# Patient Record
Sex: MALE | Race: BLACK OR AFRICAN AMERICAN | NOT HISPANIC OR LATINO | URBAN - METROPOLITAN AREA
[De-identification: names, ages, dates, MRNs, and addresses within clinical notes are randomized per-mention and may not be internally consistent; named-entity substitution may affect disease eponyms.]

---

## 2020-11-09 ENCOUNTER — HOSPITAL ENCOUNTER (EMERGENCY)
Facility: HOSPITAL | Age: 30
Discharge: HOME | End: 2020-11-09
Attending: EMERGENCY MEDICINE

## 2020-11-09 VITALS
HEIGHT: 71 IN | SYSTOLIC BLOOD PRESSURE: 140 MMHG | OXYGEN SATURATION: 100 % | TEMPERATURE: 99 F | WEIGHT: 160 LBS | DIASTOLIC BLOOD PRESSURE: 79 MMHG | BODY MASS INDEX: 22.4 KG/M2 | HEART RATE: 88 BPM | RESPIRATION RATE: 16 BRPM

## 2020-11-09 DIAGNOSIS — R11.2 NON-INTRACTABLE VOMITING WITH NAUSEA, UNSPECIFIED VOMITING TYPE: Primary | ICD-10-CM

## 2020-11-09 DIAGNOSIS — K52.9 GASTROENTERITIS: ICD-10-CM

## 2020-11-09 LAB
ALBUMIN SERPL BCP-MCNC: 4.4 G/DL (ref 3.5–5.2)
ALP SERPL-CCNC: 86 U/L (ref 55–135)
ALT SERPL W/O P-5'-P-CCNC: 25 U/L (ref 10–44)
ANION GAP SERPL CALC-SCNC: 9 MMOL/L (ref 8–16)
AST SERPL-CCNC: 28 U/L (ref 10–40)
BASOPHILS # BLD AUTO: 0.04 K/UL (ref 0–0.2)
BASOPHILS NFR BLD: 0.6 % (ref 0–1.9)
BILIRUB SERPL-MCNC: 0.6 MG/DL (ref 0.1–1)
BILIRUB UR QL STRIP: NEGATIVE
BUN SERPL-MCNC: 18 MG/DL (ref 6–20)
CALCIUM SERPL-MCNC: 9.6 MG/DL (ref 8.7–10.5)
CHLORIDE SERPL-SCNC: 98 MMOL/L (ref 95–110)
CLARITY UR: CLEAR
CO2 SERPL-SCNC: 31 MMOL/L (ref 23–29)
COLOR UR: YELLOW
CREAT SERPL-MCNC: 1.1 MG/DL (ref 0.5–1.4)
DIFFERENTIAL METHOD: ABNORMAL
EOSINOPHIL # BLD AUTO: 0 K/UL (ref 0–0.5)
EOSINOPHIL NFR BLD: 0.3 % (ref 0–8)
ERYTHROCYTE [DISTWIDTH] IN BLOOD BY AUTOMATED COUNT: 12.5 % (ref 11.5–14.5)
EST. GFR  (AFRICAN AMERICAN): >60 ML/MIN/1.73 M^2
EST. GFR  (NON AFRICAN AMERICAN): >60 ML/MIN/1.73 M^2
GLUCOSE SERPL-MCNC: 94 MG/DL (ref 70–110)
GLUCOSE UR QL STRIP: NEGATIVE
HCT VFR BLD AUTO: 44.1 % (ref 40–54)
HGB BLD-MCNC: 15.3 G/DL (ref 14–18)
HGB UR QL STRIP: NEGATIVE
IMM GRANULOCYTES # BLD AUTO: 0.01 K/UL (ref 0–0.04)
IMM GRANULOCYTES NFR BLD AUTO: 0.2 % (ref 0–0.5)
KETONES UR QL STRIP: NEGATIVE
LEUKOCYTE ESTERASE UR QL STRIP: NEGATIVE
LIPASE SERPL-CCNC: 22 U/L (ref 4–60)
LYMPHOCYTES # BLD AUTO: 1.7 K/UL (ref 1–4.8)
LYMPHOCYTES NFR BLD: 26.3 % (ref 18–48)
MCH RBC QN AUTO: 30.8 PG (ref 27–31)
MCHC RBC AUTO-ENTMCNC: 34.7 G/DL (ref 32–36)
MCV RBC AUTO: 89 FL (ref 82–98)
MONOCYTES # BLD AUTO: 0.4 K/UL (ref 0.3–1)
MONOCYTES NFR BLD: 5.8 % (ref 4–15)
NEUTROPHILS # BLD AUTO: 4.3 K/UL (ref 1.8–7.7)
NEUTROPHILS NFR BLD: 66.8 % (ref 38–73)
NITRITE UR QL STRIP: NEGATIVE
NRBC BLD-RTO: 0 /100 WBC
PH UR STRIP: 6 [PH] (ref 5–8)
PLATELET # BLD AUTO: 167 K/UL (ref 150–350)
PMV BLD AUTO: 13 FL (ref 9.2–12.9)
POTASSIUM SERPL-SCNC: 4.1 MMOL/L (ref 3.5–5.1)
PROT SERPL-MCNC: 7.8 G/DL (ref 6–8.4)
PROT UR QL STRIP: NEGATIVE
RBC # BLD AUTO: 4.96 M/UL (ref 4.6–6.2)
SODIUM SERPL-SCNC: 138 MMOL/L (ref 136–145)
SP GR UR STRIP: 1.02 (ref 1–1.03)
URN SPEC COLLECT METH UR: NORMAL
UROBILINOGEN UR STRIP-ACNC: NEGATIVE EU/DL
WBC # BLD AUTO: 6.43 K/UL (ref 3.9–12.7)

## 2020-11-09 PROCEDURE — 99285 EMERGENCY DEPT VISIT HI MDM: CPT | Mod: 25

## 2020-11-09 PROCEDURE — 85025 COMPLETE CBC W/AUTO DIFF WBC: CPT

## 2020-11-09 PROCEDURE — 96376 TX/PRO/DX INJ SAME DRUG ADON: CPT

## 2020-11-09 PROCEDURE — 83690 ASSAY OF LIPASE: CPT

## 2020-11-09 PROCEDURE — 81003 URINALYSIS AUTO W/O SCOPE: CPT

## 2020-11-09 PROCEDURE — 25500020 PHARM REV CODE 255: Performed by: EMERGENCY MEDICINE

## 2020-11-09 PROCEDURE — 96374 THER/PROPH/DIAG INJ IV PUSH: CPT

## 2020-11-09 PROCEDURE — 96361 HYDRATE IV INFUSION ADD-ON: CPT

## 2020-11-09 PROCEDURE — 96375 TX/PRO/DX INJ NEW DRUG ADDON: CPT

## 2020-11-09 PROCEDURE — 63600175 PHARM REV CODE 636 W HCPCS: Performed by: EMERGENCY MEDICINE

## 2020-11-09 PROCEDURE — 80053 COMPREHEN METABOLIC PANEL: CPT

## 2020-11-09 RX ORDER — DICYCLOMINE HYDROCHLORIDE 20 MG/1
20 TABLET ORAL 3 TIMES DAILY
Qty: 20 TABLET | Refills: 0 | Status: SHIPPED | OUTPATIENT
Start: 2020-11-09

## 2020-11-09 RX ORDER — ONDANSETRON 4 MG/1
4 TABLET, FILM COATED ORAL EVERY 6 HOURS PRN
Qty: 12 TABLET | Refills: 0 | OUTPATIENT
Start: 2020-11-09 | End: 2021-06-15

## 2020-11-09 RX ORDER — HYDROMORPHONE HYDROCHLORIDE 2 MG/ML
0.5 INJECTION, SOLUTION INTRAMUSCULAR; INTRAVENOUS; SUBCUTANEOUS
Status: COMPLETED | OUTPATIENT
Start: 2020-11-09 | End: 2020-11-09

## 2020-11-09 RX ORDER — ONDANSETRON 2 MG/ML
4 INJECTION INTRAMUSCULAR; INTRAVENOUS
Status: COMPLETED | OUTPATIENT
Start: 2020-11-09 | End: 2020-11-09

## 2020-11-09 RX ORDER — KETOROLAC TROMETHAMINE 30 MG/ML
10 INJECTION, SOLUTION INTRAMUSCULAR; INTRAVENOUS
Status: COMPLETED | OUTPATIENT
Start: 2020-11-09 | End: 2020-11-09

## 2020-11-09 RX ADMIN — SODIUM CHLORIDE, SODIUM LACTATE, POTASSIUM CHLORIDE, AND CALCIUM CHLORIDE 1000 ML: .6; .31; .03; .02 INJECTION, SOLUTION INTRAVENOUS at 08:11

## 2020-11-09 RX ADMIN — ONDANSETRON 4 MG: 2 INJECTION INTRAMUSCULAR; INTRAVENOUS at 08:11

## 2020-11-09 RX ADMIN — IOHEXOL 75 ML: 350 INJECTION, SOLUTION INTRAVENOUS at 10:11

## 2020-11-09 RX ADMIN — KETOROLAC TROMETHAMINE 10 MG: 30 INJECTION, SOLUTION INTRAMUSCULAR; INTRAVENOUS at 08:11

## 2020-11-09 RX ADMIN — HYDROMORPHONE HYDROCHLORIDE 0.5 MG: 2 INJECTION INTRAMUSCULAR; INTRAVENOUS; SUBCUTANEOUS at 10:11

## 2020-11-09 RX ADMIN — ONDANSETRON 4 MG: 2 INJECTION INTRAMUSCULAR; INTRAVENOUS at 09:11

## 2020-11-09 NOTE — DISCHARGE INSTRUCTIONS
Please return if you develop fever >100.4, vomiting and inability to keep water down, worsening pain, especially if the pain moved to the right lower part of your abdomen or any other concerns. Follow up with your primary care doctor in 2-3 days for recheck of symptoms.     Thank you for coming to our Emergency Department today. It is important to remember that some problems are difficult to diagnose and may not be found during your first visit. Be sure to follow up with your primary care doctor and review any labs/imaging that was performed with them. If you do not have a primary care doctor, you may contact the one listed on your discharge paperwork or you may also call the [facility] Appointment Desk at [phone] to schedule an appointment with one.     All medications may potentially have side effects and it is impossible to predict which medications may give you side effects. If you feel that you are having a negative effect of any medication you should immediately stop taking them and seek medical attention.    Return to the ER with any questions/concerns, new/concerning symptoms, worsening or failure to improve. Do not drive or make any important decisions for 24 hours if you have received any pain medications, sedatives or mood altering drugs during your ER visit.

## 2020-11-09 NOTE — ED NOTES
Gave patient few sips of water for PO test. Pt stated he vomited right after given Dilaudid. Notified pt to press call light if he vomits.

## 2020-11-09 NOTE — ED NOTES
"Gave pt a few sips of water for a PO test. Pt stated he vomited right before this nurse walked in there. Pt stated that the pain comes and goes and is "a little better". Pt did not vomit up water while this nurse was in the room but told pt to press the call light if he does. Pt showed understanding. Dr. Aguilera notified.   "

## 2020-11-09 NOTE — ED TRIAGE NOTES
Patient presents to the ED with c/o abdominal pain, N/V since last night. Pt states anytime he tries to eat something he vomits it back up. Pt reports RLQ and LLQ aching abdominal pain. Last BM was last night and was normal w/o blood. NAD noted. AAO x4. Pt reports taking Pepto bismol, Larissa seltzer, and TUMS with no relief.

## 2020-11-09 NOTE — ED PROVIDER NOTES
"Encounter Date: 2020    SCRIBE #1 NOTE: I, Michelle Gonzalez, am scribing for, and in the presence of,  Dia Aguilera MD. I have scribed the following portions of the note - Other sections scribed: HPI, ROS, PE.       History     Chief Complaint   Patient presents with    Abdominal Pain     started last night    Nausea    Vomiting     This is a 29 y.o. male with no PMHx who presents to the ED complaining of intermittent lower abdominal pain with associated nausea and vomiting that started last night. He describes it as an aching and burning pain that feels like a "squeezing" sensation. He states that he's had about 4-5 episodes of emesis and he can't tolerate any PO intake. He reports that he tried to take Pepto-Bismol, but he threw it up. He adds that he was able to take chewable Tums, but the pain came back. He states that his last bowel movement was yesterday and it was normal. He reports that he does drink a lot of milk, but it wasn't . He denies eating any bad foods recently. He denies any known sick contact or exposure to COVID-19. He denies taking any daily medications or having any allergies to medications. He denies any past surgeries. He notes tobacco use (1/2 pack per day) and denies any alcohol or drug use. Denies fever, cough, dysuria, hematuria, frequency, testicular pain, testicular swelling, penile pain, and penile discharge. No other associated symptoms.     The history is provided by the patient. No  was used.     Review of patient's allergies indicates:  No Known Allergies  History reviewed. No pertinent past medical history.  History reviewed. No pertinent surgical history.  History reviewed. No pertinent family history.  Social History     Tobacco Use    Smoking status: Current Every Day Smoker     Packs/day: 0.50     Types: Cigarettes    Smokeless tobacco: Never Used   Substance Use Topics    Alcohol use: Not Currently    Drug use: Never     Review of Systems "   Constitutional: Negative for chills, diaphoresis and fever.   HENT: Negative for sore throat.    Eyes: Negative for photophobia and visual disturbance.   Respiratory: Negative for cough and shortness of breath.    Cardiovascular: Negative for chest pain and leg swelling.   Gastrointestinal: Positive for abdominal pain, nausea and vomiting. Negative for blood in stool, constipation and diarrhea.   Genitourinary: Negative for discharge, dysuria, frequency, hematuria, penile pain, scrotal swelling, testicular pain and urgency.   Musculoskeletal: Negative for back pain, neck pain and neck stiffness.   Skin: Negative for rash and wound.   Neurological: Negative for weakness, light-headedness, numbness and headaches.   Hematological: Does not bruise/bleed easily.   Psychiatric/Behavioral: Negative for confusion and suicidal ideas.   All other systems reviewed and are negative.      Physical Exam     Initial Vitals [11/09/20 0732]   BP Pulse Resp Temp SpO2   (!) 149/83 65 18 98.7 °F (37.1 °C) 99 %      MAP       --         Physical Exam    Nursing note and vitals reviewed.  Constitutional: He appears well-developed and well-nourished. He is not diaphoretic. No distress.   Patient comfortable at this time, in NAD   HENT:   Head: Normocephalic and atraumatic.   Right Ear: External ear normal.   Left Ear: External ear normal.   Mouth/Throat: Oropharynx is clear and moist. No oropharyngeal exudate.   Eyes: Conjunctivae and EOM are normal. Pupils are equal, round, and reactive to light. Right eye exhibits no discharge. Left eye exhibits no discharge.   Neck: Normal range of motion. Neck supple. No JVD present.   Cardiovascular: Normal rate, regular rhythm, normal heart sounds and intact distal pulses. Exam reveals no gallop and no friction rub.    No murmur heard.  Pulmonary/Chest: Breath sounds normal. No respiratory distress. He has no wheezes. He has no rhonchi. He has no rales.   Abdominal: Soft. Bowel sounds are normal.  He exhibits no distension. There is no abdominal tenderness. There is no rebound and no guarding.   Negative Stearns's sign, no CVA tenderness   Musculoskeletal: Normal range of motion. No tenderness or edema.   Lymphadenopathy:     He has no cervical adenopathy.   Neurological: He is alert and oriented to person, place, and time. He has normal strength. No cranial nerve deficit. GCS score is 15. GCS eye subscore is 4. GCS verbal subscore is 5. GCS motor subscore is 6.   Skin: Skin is warm and dry. Capillary refill takes less than 2 seconds.   Psychiatric: He has a normal mood and affect. Thought content normal.         ED Course   Procedures  Labs Reviewed   CBC W/ AUTO DIFFERENTIAL - Abnormal; Notable for the following components:       Result Value    MPV 13.0 (*)     All other components within normal limits   COMPREHENSIVE METABOLIC PANEL - Abnormal; Notable for the following components:    CO2 31 (*)     All other components within normal limits   LIPASE   URINALYSIS, REFLEX TO URINE CULTURE    Narrative:     Specimen Source->Urine          Imaging Results          CT Abdomen Pelvis With Contrast (Final result)  Result time 11/09/20 10:59:31    Final result by Cristi Starr MD (11/09/20 10:59:31)                 Impression:      1. No definite acute findings identified in the abdomen or pelvis.  2. Note that while the appendix is not confidently identified on the current examination, there are no acute appearing inflammatory changes in the right lower quadrant.      Electronically signed by: Cristi Starr  Date:    11/09/2020  Time:    10:59             Narrative:    EXAMINATION:  CT ABDOMEN PELVIS WITH CONTRAST    CLINICAL HISTORY:  RLQ abdominal pain, appendicitis suspected (Age => 12y);    TECHNIQUE:  Low dose axial images, sagittal and coronal reformations were obtained from the lung bases to the pubic symphysis following the IV administration of 75 mL of Omnipaque  350    COMPARISON:  None.    FINDINGS:  Lower chest: Unremarkable.    Liver: Unremarkable.    Gallbladder and bile ducts: Unremarkable. No biliary ductal dilatation.    Pancreas: Unremarkable.    Spleen: Unremarkable.    Adrenals: Unremarkable.    Kidneys: Unremarkable.    Lymph nodes: No abdominal or pelvic lymphadenopathy.    Bowel and mesentery: No evidence of bowel obstruction or acute inflammation.Note that the appendix is not confidently identified, however there are no acute appearing inflammatory changes identified in the right lower quadrant the abdomen.    Abdominal aorta: Unremarkable.    Inferior vena cava: Unremarkable.    Free fluid or free air: None.    Pelvis: Unremarkable.    Body wall: Unremarkable.    Bones: Unremarkable.                                 Medical Decision Making:   Clinical Tests:   Lab Tests: Ordered and Reviewed  MDM  This is an emergent evaluation of a 29 y.o. male presenting to the ED for generalized abdominal pain associated with nausea, non-bloody/bilious vomiting. Denies fever, significant weight loss, recent hospitalization or use of antibiotics, or symptoms lasting greater than a week. Afebrile. Patient is non-toxic appearing and in no acute distress.    Appears well and is tolerating PO in ED after antinausea medicine. Vitals stable. Repeat abdominal exam benign, no tenderness on repeat abdominal exam, patient states it comes intermittently.    Given rapid onset and characteristics of this patient's spectrum of symptoms, short duration of symptoms, and benign abdominal exam, patient likely has a variety of viral gastroenteritis. No signs of severe dehydration to suggest risk of RYAN or significant electrolyte/metabolic disturbance today. CT abd pelvis with no acute findings, unable to visualize appendix but not secondary signs of appendicitis. Given the above, I have also considered but doubt IBD, infectious colitis, DKA, SBO, pancreatitis, acute cholecystitis, UTI,  ureteral stone, and appendicitis.     Discharged home with supportive care. Advised maintaining adequate hydration and switching to a liquid diet; advising diet as tolerated. Instructed to follow up with PCP for reevaluation and management of symptoms. Will send home with bentyl and zofran. Given strict return precautions for appendicitis.    I discussed with the patient the diagnosis, treatment plan, indications for return to the emergency department, and for expected follow-up. The patient verbalized an understanding. The patient is asked if there are any questions or concerns. We discuss the case, until all issues are addressed to the patients satisfaction. Patient understands and is agreeable to the plan.            Scribe Attestation:   Scribe #1: I performed the above scribed service and the documentation accurately describes the services I performed. I attest to the accuracy of the note.                      Clinical Impression:     ICD-10-CM ICD-9-CM   1. Non-intractable vomiting with nausea, unspecified vomiting type  R11.2 787.01   2. Gastroenteritis  K52.9 558.9                          ED Disposition Condition    Discharge Stable        ED Prescriptions     Medication Sig Dispense Start Date End Date Auth. Provider    ondansetron (ZOFRAN) 4 MG tablet Take 1 tablet (4 mg total) by mouth every 6 (six) hours as needed for Nausea. 12 tablet 11/9/2020  Dia Aguilera MD    dicyclomine (BENTYL) 20 mg tablet Take 1 tablet (20 mg total) by mouth 3 (three) times daily. As needed for abdominal pain 20 tablet 11/9/2020  Dia Aguilera MD        Follow-up Information     Follow up With Specialties Details Why Contact Info     [facility]  Schedule an appointment as soon as possible for a visit in 2 days to establish primary doctor, to discuss recent ED visit [address]  [place] [address]  [phone]      [facility]  As needed, If symptoms worsen [address] [facility] [id number] [phone]                      Dusty Attestation: I, Dia Aguilera, personally performed the services described in this documentation. All medical record entries made by the scribe were at my direction and in my presence. I have reviewed the chart and agree that the record reflects my personal performance and is accurate and complete.                 Dia Aguilera MD  11/09/20 0342

## 2020-11-10 ENCOUNTER — HOSPITAL ENCOUNTER (EMERGENCY)
Facility: HOSPITAL | Age: 30
Discharge: HOME | End: 2020-11-10
Attending: EMERGENCY MEDICINE

## 2020-11-10 VITALS
BODY MASS INDEX: 22.4 KG/M2 | HEART RATE: 54 BPM | RESPIRATION RATE: 18 BRPM | SYSTOLIC BLOOD PRESSURE: 135 MMHG | WEIGHT: 160 LBS | DIASTOLIC BLOOD PRESSURE: 96 MMHG | TEMPERATURE: 98 F | HEIGHT: 71 IN | OXYGEN SATURATION: 95 %

## 2020-11-10 DIAGNOSIS — R11.2 NAUSEA AND VOMITING, INTRACTABILITY OF VOMITING NOT SPECIFIED, UNSPECIFIED VOMITING TYPE: Primary | ICD-10-CM

## 2020-11-10 DIAGNOSIS — R10.9 ABDOMINAL PAIN, UNSPECIFIED ABDOMINAL LOCATION: ICD-10-CM

## 2020-11-10 LAB
ALBUMIN SERPL BCP-MCNC: 4.7 G/DL (ref 3.5–5.2)
ALP SERPL-CCNC: 86 U/L (ref 55–135)
ALT SERPL W/O P-5'-P-CCNC: 24 U/L (ref 10–44)
AMPHET+METHAMPHET UR QL: NEGATIVE
ANION GAP SERPL CALC-SCNC: 10 MMOL/L (ref 8–16)
AST SERPL-CCNC: 30 U/L (ref 10–40)
BARBITURATES UR QL SCN>200 NG/ML: NEGATIVE
BASOPHILS # BLD AUTO: 0.04 K/UL (ref 0–0.2)
BASOPHILS NFR BLD: 0.4 % (ref 0–1.9)
BENZODIAZ UR QL SCN>200 NG/ML: NEGATIVE
BILIRUB SERPL-MCNC: 0.5 MG/DL (ref 0.1–1)
BILIRUB UR QL STRIP: NEGATIVE
BUN SERPL-MCNC: 17 MG/DL (ref 6–20)
BZE UR QL SCN: NEGATIVE
CALCIUM SERPL-MCNC: 10.2 MG/DL (ref 8.7–10.5)
CANNABINOIDS UR QL SCN: NORMAL
CHLORIDE SERPL-SCNC: 98 MMOL/L (ref 95–110)
CLARITY UR: CLEAR
CO2 SERPL-SCNC: 31 MMOL/L (ref 23–29)
COLOR UR: YELLOW
CREAT SERPL-MCNC: 1.2 MG/DL (ref 0.5–1.4)
CREAT UR-MCNC: 247 MG/DL (ref 23–375)
DIFFERENTIAL METHOD: ABNORMAL
EOSINOPHIL # BLD AUTO: 0 K/UL (ref 0–0.5)
EOSINOPHIL NFR BLD: 0.2 % (ref 0–8)
ERYTHROCYTE [DISTWIDTH] IN BLOOD BY AUTOMATED COUNT: 12.4 % (ref 11.5–14.5)
EST. GFR  (AFRICAN AMERICAN): >60 ML/MIN/1.73 M^2
EST. GFR  (NON AFRICAN AMERICAN): >60 ML/MIN/1.73 M^2
GLUCOSE SERPL-MCNC: 114 MG/DL (ref 70–110)
GLUCOSE UR QL STRIP: NEGATIVE
HCT VFR BLD AUTO: 45.7 % (ref 40–54)
HGB BLD-MCNC: 15.6 G/DL (ref 14–18)
HGB UR QL STRIP: ABNORMAL
IMM GRANULOCYTES # BLD AUTO: 0.02 K/UL (ref 0–0.04)
IMM GRANULOCYTES NFR BLD AUTO: 0.2 % (ref 0–0.5)
KETONES UR QL STRIP: ABNORMAL
LEUKOCYTE ESTERASE UR QL STRIP: NEGATIVE
LIPASE SERPL-CCNC: 48 U/L (ref 4–60)
LYMPHOCYTES # BLD AUTO: 1.9 K/UL (ref 1–4.8)
LYMPHOCYTES NFR BLD: 20.4 % (ref 18–48)
MCH RBC QN AUTO: 30.2 PG (ref 27–31)
MCHC RBC AUTO-ENTMCNC: 34.1 G/DL (ref 32–36)
MCV RBC AUTO: 88 FL (ref 82–98)
METHADONE UR QL SCN>300 NG/ML: NEGATIVE
MICROSCOPIC COMMENT: NORMAL
MONOCYTES # BLD AUTO: 0.4 K/UL (ref 0.3–1)
MONOCYTES NFR BLD: 4.5 % (ref 4–15)
NEUTROPHILS # BLD AUTO: 6.7 K/UL (ref 1.8–7.7)
NEUTROPHILS NFR BLD: 74.3 % (ref 38–73)
NITRITE UR QL STRIP: NEGATIVE
NRBC BLD-RTO: 0 /100 WBC
OPIATES UR QL SCN: NORMAL
PCP UR QL SCN>25 NG/ML: NEGATIVE
PH UR STRIP: 6 [PH] (ref 5–8)
PLATELET # BLD AUTO: 166 K/UL (ref 150–350)
PMV BLD AUTO: 12.9 FL (ref 9.2–12.9)
POTASSIUM SERPL-SCNC: 4.1 MMOL/L (ref 3.5–5.1)
PROT SERPL-MCNC: 8.3 G/DL (ref 6–8.4)
PROT UR QL STRIP: NEGATIVE
RBC # BLD AUTO: 5.17 M/UL (ref 4.6–6.2)
RBC #/AREA URNS HPF: 1 /HPF (ref 0–4)
SODIUM SERPL-SCNC: 139 MMOL/L (ref 136–145)
SP GR UR STRIP: 1.02 (ref 1–1.03)
SQUAMOUS #/AREA URNS HPF: 2 /HPF
TOXICOLOGY INFORMATION: NORMAL
URN SPEC COLLECT METH UR: ABNORMAL
UROBILINOGEN UR STRIP-ACNC: NEGATIVE EU/DL
WBC # BLD AUTO: 9.05 K/UL (ref 3.9–12.7)

## 2020-11-10 PROCEDURE — 80053 COMPREHEN METABOLIC PANEL: CPT

## 2020-11-10 PROCEDURE — 96361 HYDRATE IV INFUSION ADD-ON: CPT

## 2020-11-10 PROCEDURE — 25000003 PHARM REV CODE 250: Performed by: EMERGENCY MEDICINE

## 2020-11-10 PROCEDURE — 96374 THER/PROPH/DIAG INJ IV PUSH: CPT

## 2020-11-10 PROCEDURE — 80307 DRUG TEST PRSMV CHEM ANLYZR: CPT

## 2020-11-10 PROCEDURE — 85025 COMPLETE CBC W/AUTO DIFF WBC: CPT

## 2020-11-10 PROCEDURE — 83690 ASSAY OF LIPASE: CPT

## 2020-11-10 PROCEDURE — 63600175 PHARM REV CODE 636 W HCPCS: Performed by: EMERGENCY MEDICINE

## 2020-11-10 PROCEDURE — 99284 EMERGENCY DEPT VISIT MOD MDM: CPT | Mod: 25

## 2020-11-10 PROCEDURE — 96375 TX/PRO/DX INJ NEW DRUG ADDON: CPT

## 2020-11-10 PROCEDURE — 81000 URINALYSIS NONAUTO W/SCOPE: CPT | Mod: 59

## 2020-11-10 RX ORDER — DICYCLOMINE HYDROCHLORIDE 10 MG/1
20 CAPSULE ORAL
Status: COMPLETED | OUTPATIENT
Start: 2020-11-10 | End: 2020-11-10

## 2020-11-10 RX ORDER — IBUPROFEN 600 MG/1
600 TABLET ORAL EVERY 6 HOURS PRN
Qty: 20 TABLET | Refills: 0 | Status: SHIPPED | OUTPATIENT
Start: 2020-11-10

## 2020-11-10 RX ORDER — MORPHINE SULFATE 4 MG/ML
4 INJECTION, SOLUTION INTRAMUSCULAR; INTRAVENOUS
Status: COMPLETED | OUTPATIENT
Start: 2020-11-10 | End: 2020-11-10

## 2020-11-10 RX ORDER — ONDANSETRON 2 MG/ML
4 INJECTION INTRAMUSCULAR; INTRAVENOUS
Status: COMPLETED | OUTPATIENT
Start: 2020-11-10 | End: 2020-11-10

## 2020-11-10 RX ADMIN — SODIUM CHLORIDE 1000 ML: 0.9 INJECTION, SOLUTION INTRAVENOUS at 06:11

## 2020-11-10 RX ADMIN — ONDANSETRON 4 MG: 2 INJECTION INTRAMUSCULAR; INTRAVENOUS at 06:11

## 2020-11-10 RX ADMIN — MORPHINE SULFATE 4 MG: 4 INJECTION INTRAVENOUS at 06:11

## 2020-11-10 RX ADMIN — DICYCLOMINE HYDROCHLORIDE 20 MG: 10 CAPSULE ORAL at 06:11

## 2020-11-10 RX ADMIN — SODIUM CHLORIDE 1000 ML: 0.9 INJECTION, SOLUTION INTRAVENOUS at 08:11

## 2020-11-10 NOTE — ED NOTES
IV fluids complete, pt resting quietly with eyes closed, respirations unlabored, appears to be sleeping in prone position.

## 2020-11-10 NOTE — ED NOTES
Resting quietly on left side, appears to be sleeping. Respiration unlabored. BP and O2 monitoring continued.

## 2020-11-10 NOTE — ED PROVIDER NOTES
Encounter Date: 11/10/2020    SCRIBE #1 NOTE: I, Chitra Bravo, am scribing for, and in the presence of,  Aracelis Bob MD. I have scribed the following portions of the note - Other sections scribed: HPI, ROS, PE.       History     Chief Complaint   Patient presents with    Abdominal Pain     Patient c/o periumbilical pain with n/v x 2 days.  Reports he was seen in ED yesterday and reports he was discharged home and symptoms have become worse.  Denies fever, diarrhea, constipation, or urinary symptoms.    Vomiting     CC: Abdominal pain    HPI: This is a 29 y.o. M who has no PMHx who presents to the ED for emergent evaluation of acute lower abdominal pain with associated nausea and vomiting that began 2 days ago. He describes the abdominal pain as a sharp and stabbing pain. Pt reports that he was evaluated and treated at this ED for similar problem yesterday, which he had a normal CT scan and was prescribed medications at that visit. Pt reports temporary relief with a single dosage of prescribed medications yesterday (Zofran and Bentyl). He has also attempted taking Ibuprofen since the onset of symptoms without relief. Pt reports soaking in a warm bath helps some. He states that the abdominal pain is constant since last night. Although he has not made a BM within the past  2 days because he has not eaten anything, pt denies constipation. No ill contact at home. Pt admits to marijuana use but states he quit 3-4 days ago. Pt denies fever, diarrhea, dysuria, penile discharge, or Hx of abdominal surgery.    The history is provided by the patient. No  was used.     Review of patient's allergies indicates:  No Known Allergies  No past medical history on file.  No past surgical history on file.  No family history on file.  Social History     Tobacco Use    Smoking status: Current Every Day Smoker     Packs/day: 0.50     Types: Cigarettes    Smokeless tobacco: Never Used   Substance Use Topics     Alcohol use: Not Currently    Drug use: Never     Review of Systems   Constitutional: Negative for chills and fever.   HENT: Negative for congestion and sore throat.    Eyes: Negative for visual disturbance.   Respiratory: Negative for cough and shortness of breath.    Cardiovascular: Negative for chest pain.   Gastrointestinal: Positive for abdominal pain, nausea and vomiting. Negative for diarrhea.   Genitourinary: Negative for discharge and dysuria.   Musculoskeletal: Negative for back pain.   Skin: Negative for rash.   Neurological: Negative for weakness and headaches.   Hematological: Does not bruise/bleed easily.   Psychiatric/Behavioral: Negative for confusion.       Physical Exam     Initial Vitals [11/10/20 0621]   BP Pulse Resp Temp SpO2   (!) 153/79 64 20 98.6 °F (37 °C) 98 %      MAP       --         Physical Exam    Nursing note and vitals reviewed.  Constitutional: He appears well-developed and well-nourished. He is not diaphoretic. No distress.   HENT:   Head: Normocephalic and atraumatic.   Mouth/Throat: Oropharynx is clear and moist.   Eyes: EOM are normal. Pupils are equal, round, and reactive to light.   Neck: Neck supple.   Cardiovascular: Normal rate and regular rhythm.   Pulmonary/Chest: Breath sounds normal. No respiratory distress.   Abdominal: Soft. Bowel sounds are normal. He exhibits no distension. There is no rigidity and no guarding.   Diffuse tenderness to the lower abdomen.   Musculoskeletal: No edema.   Neurological: He is alert and oriented to person, place, and time.   Skin: Skin is warm and dry.   Psychiatric: He has a normal mood and affect.         ED Course   Procedures  Labs Reviewed   COMPREHENSIVE METABOLIC PANEL - Abnormal; Notable for the following components:       Result Value    CO2 31 (*)     Glucose 114 (*)     All other components within normal limits   CBC W/ AUTO DIFFERENTIAL - Abnormal; Notable for the following components:    Gran % 74.3 (*)     All other  components within normal limits   URINALYSIS, REFLEX TO URINE CULTURE - Abnormal; Notable for the following components:    Ketones, UA 1+ (*)     Occult Blood UA 1+ (*)     All other components within normal limits    Narrative:     Specimen Source->Urine   LIPASE   DRUG SCREEN PANEL, URINE EMERGENCY    Narrative:     Specimen Source->Urine   URINALYSIS MICROSCOPIC    Narrative:     Specimen Source->Urine          Imaging Results    None          Medical Decision Making:   Initial Assessment:   29-year-old male presenting with lower abdominal pain, nausea, vomiting.  On exam, patient has tenderness in the lower abdomen without rigidity or guarding.  I reviewed CT from yesterday, did not definitively identify the appendix but there is no secondary inflammatory changes noted.  My overall clinical impression is patient is cyclic vomiting syndrome versus viral gastroenteritis.  Low suspicion for acute appendicitis based on symptom report and exam.  I plan to assess him with basic labs, deferring advanced imaging as he had a CT scan yesterday.  Will treat with IV fluids, antiemetic, analgesia, Bentyl and reassess.  ED Management:  On reassessment, patient reports feeling improved.  No longer nauseated and pain is controlled.  He has a prescription for Zofran and Bentyl.  Will prescribe ibuprofen to take if needed for pain.  Advised to avoid use of marijuana.  Patient states he understands and agrees with plan.            Scribe Attestation:   Scribe #1: I performed the above scribed service and the documentation accurately describes the services I performed. I attest to the accuracy of the note.                         I, Aracelis Bob MD , personally performed the services described in this documentation. All medical record entries made by the scribe were at my direction and in my presence. I have reviewed the chart and agree that the record reflects my personal performance and is accurate and complete.    Clinical  Impression:     ICD-10-CM ICD-9-CM   1. Nausea and vomiting, intractability of vomiting not specified, unspecified vomiting type  R11.2 787.01   2. Abdominal pain, unspecified abdominal location  R10.9 789.00                          ED Disposition Condition    Discharge Stable        ED Prescriptions     Medication Sig Dispense Start Date End Date Auth. Provider    ibuprofen (ADVIL,MOTRIN) 600 MG tablet Take 1 tablet (600 mg total) by mouth every 6 (six) hours as needed for Pain. 20 tablet 11/10/2020  Aracelis Bob MD        Follow-up Information     Follow up With Specialties Details Why Contact Info    [facility]  Schedule an appointment as soon as possible for a visit in 3 days To establish primary care [address] [place] [address]  [phone]      [facility]  As needed, If symptoms worsen [address]  [facility] [id number] [phone]                        This dictation has been generated using M-Modal Fluency Direct dictation; some phonetic errors may occur.          Aracelis Bob MD  11/10/20 0989

## 2020-11-10 NOTE — ED NOTES
Pt c/o lower abd pain since yesterday. Pt was here yesterday for same and discharged w/ prescriptions. Pt is A & O x 3, denies SOB, but admits to N/V, but no diarrhea. Respirations are even and unlabored. Skin isw warm, dry and pink. AMADO x 3mm, BBS- CTA. Abd- SNT. PSM x 4 exts. Will continue to monitor closely.

## 2020-11-13 ENCOUNTER — HOSPITAL ENCOUNTER (EMERGENCY)
Facility: HOSPITAL | Age: 30
Discharge: HOME | End: 2020-11-13
Attending: EMERGENCY MEDICINE

## 2020-11-13 VITALS
SYSTOLIC BLOOD PRESSURE: 128 MMHG | OXYGEN SATURATION: 97 % | RESPIRATION RATE: 17 BRPM | HEIGHT: 71 IN | TEMPERATURE: 99 F | HEART RATE: 74 BPM | DIASTOLIC BLOOD PRESSURE: 75 MMHG | BODY MASS INDEX: 22.4 KG/M2 | WEIGHT: 160 LBS

## 2020-11-13 DIAGNOSIS — R11.2 NAUSEA AND VOMITING: Primary | ICD-10-CM

## 2020-11-13 DIAGNOSIS — R07.9 CHEST PAIN: ICD-10-CM

## 2020-11-13 DIAGNOSIS — R10.9 ABDOMINAL PAIN, UNSPECIFIED ABDOMINAL LOCATION: ICD-10-CM

## 2020-11-13 LAB
ALBUMIN SERPL BCP-MCNC: 3.7 G/DL (ref 3.5–5.2)
ALP SERPL-CCNC: 70 U/L (ref 55–135)
ALT SERPL W/O P-5'-P-CCNC: 14 U/L (ref 10–44)
ANION GAP SERPL CALC-SCNC: 9 MMOL/L (ref 8–16)
AST SERPL-CCNC: 20 U/L (ref 10–40)
BASOPHILS # BLD AUTO: 0.04 K/UL (ref 0–0.2)
BASOPHILS NFR BLD: 0.5 % (ref 0–1.9)
BILIRUB SERPL-MCNC: 0.6 MG/DL (ref 0.1–1)
BILIRUB UR QL STRIP: NEGATIVE
BUN SERPL-MCNC: 17 MG/DL (ref 6–20)
CALCIUM SERPL-MCNC: 7.6 MG/DL (ref 8.7–10.5)
CHLORIDE SERPL-SCNC: 99 MMOL/L (ref 95–110)
CLARITY UR: CLEAR
CO2 SERPL-SCNC: 27 MMOL/L (ref 23–29)
COLOR UR: YELLOW
CREAT SERPL-MCNC: 1.2 MG/DL (ref 0.5–1.4)
DIFFERENTIAL METHOD: ABNORMAL
EOSINOPHIL # BLD AUTO: 0 K/UL (ref 0–0.5)
EOSINOPHIL NFR BLD: 0.1 % (ref 0–8)
ERYTHROCYTE [DISTWIDTH] IN BLOOD BY AUTOMATED COUNT: 12.8 % (ref 11.5–14.5)
EST. GFR  (AFRICAN AMERICAN): >60 ML/MIN/1.73 M^2
EST. GFR  (NON AFRICAN AMERICAN): >60 ML/MIN/1.73 M^2
GLUCOSE SERPL-MCNC: 129 MG/DL (ref 70–110)
GLUCOSE UR QL STRIP: NEGATIVE
HCT VFR BLD AUTO: 45.7 % (ref 40–54)
HGB BLD-MCNC: 16.4 G/DL (ref 14–18)
HGB UR QL STRIP: NEGATIVE
IMM GRANULOCYTES # BLD AUTO: 0.02 K/UL (ref 0–0.04)
IMM GRANULOCYTES NFR BLD AUTO: 0.2 % (ref 0–0.5)
KETONES UR QL STRIP: ABNORMAL
LEUKOCYTE ESTERASE UR QL STRIP: NEGATIVE
LIPASE SERPL-CCNC: 17 U/L (ref 4–60)
LYMPHOCYTES # BLD AUTO: 2.3 K/UL (ref 1–4.8)
LYMPHOCYTES NFR BLD: 26.9 % (ref 18–48)
MCH RBC QN AUTO: 30.9 PG (ref 27–31)
MCHC RBC AUTO-ENTMCNC: 35.9 G/DL (ref 32–36)
MCV RBC AUTO: 86 FL (ref 82–98)
MONOCYTES # BLD AUTO: 0.5 K/UL (ref 0.3–1)
MONOCYTES NFR BLD: 5.3 % (ref 4–15)
NEUTROPHILS # BLD AUTO: 5.7 K/UL (ref 1.8–7.7)
NEUTROPHILS NFR BLD: 67 % (ref 38–73)
NITRITE UR QL STRIP: NEGATIVE
NRBC BLD-RTO: 0 /100 WBC
PH UR STRIP: 6 [PH] (ref 5–8)
PLATELET # BLD AUTO: 172 K/UL (ref 150–350)
PMV BLD AUTO: 13.3 FL (ref 9.2–12.9)
POTASSIUM SERPL-SCNC: 3.3 MMOL/L (ref 3.5–5.1)
PROT SERPL-MCNC: 6.5 G/DL (ref 6–8.4)
PROT UR QL STRIP: NEGATIVE
RBC # BLD AUTO: 5.31 M/UL (ref 4.6–6.2)
SODIUM SERPL-SCNC: 135 MMOL/L (ref 136–145)
SP GR UR STRIP: 1.02 (ref 1–1.03)
URN SPEC COLLECT METH UR: ABNORMAL
UROBILINOGEN UR STRIP-ACNC: NEGATIVE EU/DL
WBC # BLD AUTO: 8.44 K/UL (ref 3.9–12.7)

## 2020-11-13 PROCEDURE — 96365 THER/PROPH/DIAG IV INF INIT: CPT

## 2020-11-13 PROCEDURE — 96361 HYDRATE IV INFUSION ADD-ON: CPT

## 2020-11-13 PROCEDURE — 85025 COMPLETE CBC W/AUTO DIFF WBC: CPT

## 2020-11-13 PROCEDURE — 83690 ASSAY OF LIPASE: CPT

## 2020-11-13 PROCEDURE — 25000003 PHARM REV CODE 250: Performed by: EMERGENCY MEDICINE

## 2020-11-13 PROCEDURE — 96375 TX/PRO/DX INJ NEW DRUG ADDON: CPT

## 2020-11-13 PROCEDURE — 80053 COMPREHEN METABOLIC PANEL: CPT

## 2020-11-13 PROCEDURE — 63600175 PHARM REV CODE 636 W HCPCS: Performed by: EMERGENCY MEDICINE

## 2020-11-13 PROCEDURE — 99285 EMERGENCY DEPT VISIT HI MDM: CPT | Mod: 25

## 2020-11-13 PROCEDURE — 93005 ELECTROCARDIOGRAM TRACING: CPT

## 2020-11-13 PROCEDURE — 93010 EKG 12-LEAD: ICD-10-PCS | Mod: ,,, | Performed by: INTERNAL MEDICINE

## 2020-11-13 PROCEDURE — 81003 URINALYSIS AUTO W/O SCOPE: CPT

## 2020-11-13 PROCEDURE — 93010 ELECTROCARDIOGRAM REPORT: CPT | Mod: ,,, | Performed by: INTERNAL MEDICINE

## 2020-11-13 RX ORDER — PROMETHAZINE HYDROCHLORIDE 25 MG/1
25 SUPPOSITORY RECTAL EVERY 6 HOURS PRN
Qty: 10 SUPPOSITORY | Refills: 0 | Status: SHIPPED | OUTPATIENT
Start: 2020-11-13

## 2020-11-13 RX ORDER — KETOROLAC TROMETHAMINE 30 MG/ML
15 INJECTION, SOLUTION INTRAMUSCULAR; INTRAVENOUS
Status: COMPLETED | OUTPATIENT
Start: 2020-11-13 | End: 2020-11-13

## 2020-11-13 RX ORDER — FAMOTIDINE 10 MG/ML
20 INJECTION INTRAVENOUS
Status: COMPLETED | OUTPATIENT
Start: 2020-11-13 | End: 2020-11-13

## 2020-11-13 RX ORDER — PROMETHAZINE HYDROCHLORIDE 25 MG/1
25 TABLET ORAL EVERY 6 HOURS PRN
Qty: 15 TABLET | Refills: 0 | Status: SHIPPED | OUTPATIENT
Start: 2020-11-13

## 2020-11-13 RX ORDER — FAMOTIDINE 20 MG/1
20 TABLET, FILM COATED ORAL 2 TIMES DAILY
Qty: 20 TABLET | Refills: 0 | Status: SHIPPED | OUTPATIENT
Start: 2020-11-13 | End: 2021-11-13

## 2020-11-13 RX ADMIN — SODIUM CHLORIDE 1000 ML: 0.9 INJECTION, SOLUTION INTRAVENOUS at 01:11

## 2020-11-13 RX ADMIN — PROMETHAZINE HYDROCHLORIDE 25 MG: 25 INJECTION INTRAMUSCULAR; INTRAVENOUS at 01:11

## 2020-11-13 RX ADMIN — FAMOTIDINE 20 MG: 10 INJECTION INTRAVENOUS at 01:11

## 2020-11-13 RX ADMIN — KETOROLAC TROMETHAMINE 15 MG: 30 INJECTION, SOLUTION INTRAMUSCULAR at 01:11

## 2020-11-13 NOTE — ED PROVIDER NOTES
"Encounter Date: 11/13/2020    SCRIBE #1 NOTE: I, Christa Magdy, am scribing for, and in the presence of,  Aracelis Bob MD. I have scribed the following portions of the note - Other sections scribed: HPI, ROS, PE.       History     Chief Complaint   Patient presents with    Abdominal Pain     Constipation and n/v started this past Sunday. Dark tarry stools that started today.     Vomiting    Nausea     HPI:  This is a 29 year old male with no pertinent past medical history who presents to the ED with a chief complaint of generalized abdominal pain since Sunday. The patient has presented to the ED twice with the same symptoms on Monday and Tuesday, He reports associated symptoms of constipation, nausea, and vomiting. The patient states that he was prescribed Zofran on his previous visit, but it provided no relief. He also reports taking Tums, Pepto Bismol, and Magnesium Citrate, and had a bowel movement today that he states was dark in color. He also endorses chest pain that began suddenly today, which he describes as "pinching" and rates it 6/10, located to the left lateral side of his chest. He denies recent marijuana usage.  He does state that a hot bath helps improve his symptoms.    The history is provided by the patient and the spouse. No  was used.     Review of patient's allergies indicates:  No Known Allergies  History reviewed. No pertinent past medical history.  History reviewed. No pertinent surgical history.  History reviewed. No pertinent family history.  Social History     Tobacco Use    Smoking status: Current Every Day Smoker     Packs/day: 0.50     Types: Cigarettes    Smokeless tobacco: Never Used   Substance Use Topics    Alcohol use: Not Currently    Drug use: Never     Review of Systems   Constitutional: Negative for chills and fever.   HENT: Negative for congestion and sore throat.    Eyes: Negative for visual disturbance.   Respiratory: Negative for cough and " shortness of breath.    Cardiovascular: Positive for chest pain.   Gastrointestinal: Positive for abdominal pain, blood in stool, constipation, nausea and vomiting.   Genitourinary: Negative for dysuria.   Skin: Negative for rash.   Neurological: Negative for headaches.   Psychiatric/Behavioral: Negative for confusion.       Physical Exam     Initial Vitals [11/13/20 0123]   BP Pulse Resp Temp SpO2   (!) 142/91 70 20 98.6 °F (37 °C) 99 %      MAP       --         Physical Exam    Nursing note and vitals reviewed.  Constitutional: He appears well-developed and well-nourished. He is not diaphoretic. No distress.   HENT:   Head: Normocephalic and atraumatic.   Mouth/Throat: Oropharynx is clear and moist.   Eyes: EOM are normal. Pupils are equal, round, and reactive to light.   Neck: Neck supple.   Cardiovascular: Normal rate and regular rhythm.       Pulmonary/Chest: Breath sounds normal. No respiratory distress.   Abdominal: Soft. Bowel sounds are normal. There is abdominal tenderness. There is guarding.   Diffuse tenderness to palpation.  Voluntary guarding.   Genitourinary:    Genitourinary Comments: Normal rectal tone.  No stool in rectal vault.  FOBT negative.     Musculoskeletal: No edema.   Neurological: He is alert and oriented to person, place, and time.   Skin: Skin is warm and dry.   Psychiatric: He has a normal mood and affect.         ED Course   Procedures  Labs Reviewed   CBC W/ AUTO DIFFERENTIAL - Abnormal; Notable for the following components:       Result Value    MPV 13.3 (*)     All other components within normal limits   COMPREHENSIVE METABOLIC PANEL - Abnormal; Notable for the following components:    Sodium 135 (*)     Potassium 3.3 (*)     Glucose 129 (*)     Calcium 7.6 (*)     All other components within normal limits    Narrative:     Recoll. [id number] by [facility] at 11/13/2020 02:26, reason: Specimen   grossly hemolyzed, spoke with Alyce   URINALYSIS, REFLEX TO URINE CULTURE - Abnormal; Notable  for the following components:    Ketones, UA 1+ (*)     All other components within normal limits    Narrative:     Specimen Source->Urine   LIPASE    Narrative:     Recoll. [id number] by [facility] at 11/13/2020 02:26, reason: Specimen   grossly hemolyzed, spoke with Alyce        ECG Results          EKG 12-lead (Preliminary result)  Result time 11/13/20 01:46:15    ED Interpretation by Aracelis Bob MD (11/13/20 01:46:15)    Normal sinus rhythm, rate 74 beats per minute, normal MD interval,  milliseconds.  No STEMI.                            Imaging Results          X-Ray Abdomen AP 1 View (KUB) (Final result)  Result time 11/13/20 02:29:16    Final result by Caroline Perez MD (11/13/20 02:29:16)                 Impression:      Please see above.      Electronically signed by: Caroline Perez MD  Date:    11/13/2020  Time:    02:29             Narrative:    EXAMINATION:  XR ABDOMEN AP 1 VIEW    CLINICAL HISTORY:  Nausea with vomiting, unspecified    TECHNIQUE:  AP View(s) of the abdomen was performed.    COMPARISON:  None    FINDINGS:  Scattered air is seen throughout nondilated loops of large and small bowel.  There is fecal material scattered throughout the colon.  No definite evidence of free intraperitoneal air within allowing for patient positioning/technique.  Visualized osseous structures are intact.  Lung bases are grossly clear.                               X-Ray Chest AP Portable (Final result)  Result time 11/13/20 02:27:16    Final result by Caroline Perez MD (11/13/20 02:27:16)                 Impression:      No acute intrathoracic abnormality identified on this single radiographic view of the chest.      Electronically signed by: Caroline Perez MD  Date:    11/13/2020  Time:    02:27             Narrative:    EXAMINATION:  XR CHEST AP PORTABLE    CLINICAL HISTORY:  chest pain;    TECHNIQUE:  Single frontal view of the chest was performed.    COMPARISON:  None    FINDINGS:  Cardiac monitoring  "leads overlie the chest.  Cardiomediastinal silhouette is within normal limits.  The visualized airway is unremarkable.  The lungs are symmetrically expanded without evidence of confluent airspace consolidation, significant volume of pleural fluid or pneumothorax.  Visualized osseous structures are intact.                                 Medical Decision Making:   Initial Assessment:   29-year-old male presenting with abdominal pain, nausea, vomiting.  He reports dark stool today.  On exam, the patient has diffuse tenderness to palpation is abdomen with voluntary guarding.  The abdomen is not distended.  Differential includes but not limited to gastroenteritis, GI bleed, peptic ulcer, cyclic vomiting syndrome, constipation, bowel obstruction.  He had a CT abdomen pelvis this week that showed no acute finding, although the appendix was not identified, there is no secondary signs of inflammation.  On a subsequent visit, his symptoms were controlled prior to discharge, he had normal labs.  Will try treatment with IV fluids, Phenergan, Toradol, Pepcid.  If symptoms persist, may try haldol. With regards to chest pain, given the description and location of his pain, doubt ACS.  Doubt pneumothorax.  Suspect musculoskeletal strain.  Will obtain EKG and chest x-ray.            Scribe Attestation:   Scribe #1: I performed the above scribed service and the documentation accurately describes the services I performed. I attest to the accuracy of the note.            ED Course as of Nov 13 0418 Fri Nov 13, 2020   0347 On reassessment, patient is sleeping comfortably.  Asked him how he feels and he states "congested."  Reports nausea and abdominal pain improved.  Repeat abdominal exam with soft, nontender in all 4 quadrants, no guarding.  Labs within acceptable limits, no acute finding on KUB or chest x-ray. No leukocytosis.  Pending urinalysis. Patient given water for PO challenge.     [LH]   0416 Patient tolerating PO, feels " improved. Will d/c with phenergan PO and suppository, Pepcid, refer to GI.     [LH]      ED Course User Index  [LH] Aracelis Bob MD            Clinical Impression:     ICD-10-CM ICD-9-CM   1. Nausea and vomiting  R11.2 787.01   2. Chest pain  R07.9 786.50   3. Abdominal pain, unspecified abdominal location  R10.9 789.00                          Scribe Attestation: I, Aracelis Bob MD, personally performed the services described in this documentation. All medical record entries made by the scribe were at my direction and in my presence. I have reviewed the chart and agree that the record reflects my personal performance and is accurate and complete.               This dictation has been generated using M-Modal Fluency Direct dictation; some phonetic errors may occur.        Aracelis Bob MD  11/17/20 0832

## 2020-11-13 NOTE — ED TRIAGE NOTES
Pt presents to ED c/o pinch chest pain that started approx 30 minutes pta. Pt also reports nausea and constipation since Sunday. States he had his first bowel movement today and it was dark and tarry. Denies medical hx. Seen earlier this week for abdominal complaints.

## 2020-11-16 ENCOUNTER — TELEPHONE (OUTPATIENT)
Dept: ENDOSCOPY | Facility: HOSPITAL | Age: 30
End: 2020-11-16

## 2021-04-16 ENCOUNTER — PATIENT MESSAGE (OUTPATIENT)
Dept: RESEARCH | Facility: HOSPITAL | Age: 31
End: 2021-04-16

## 2021-06-12 ENCOUNTER — HOSPITAL ENCOUNTER (EMERGENCY)
Facility: HOSPITAL | Age: 31
Discharge: HOME | End: 2021-06-12
Attending: EMERGENCY MEDICINE

## 2021-06-12 VITALS
HEIGHT: 72 IN | HEART RATE: 53 BPM | DIASTOLIC BLOOD PRESSURE: 70 MMHG | SYSTOLIC BLOOD PRESSURE: 138 MMHG | OXYGEN SATURATION: 96 % | BODY MASS INDEX: 21.67 KG/M2 | RESPIRATION RATE: 17 BRPM | WEIGHT: 160 LBS | TEMPERATURE: 98 F

## 2021-06-12 DIAGNOSIS — S39.012A BACK STRAIN, INITIAL ENCOUNTER: Primary | ICD-10-CM

## 2021-06-12 PROCEDURE — 63600175 PHARM REV CODE 636 W HCPCS: Performed by: PHYSICIAN ASSISTANT

## 2021-06-12 PROCEDURE — 99284 EMERGENCY DEPT VISIT MOD MDM: CPT | Mod: 25

## 2021-06-12 PROCEDURE — 96372 THER/PROPH/DIAG INJ SC/IM: CPT

## 2021-06-12 RX ORDER — KETOROLAC TROMETHAMINE 30 MG/ML
15 INJECTION, SOLUTION INTRAMUSCULAR; INTRAVENOUS
Status: COMPLETED | OUTPATIENT
Start: 2021-06-12 | End: 2021-06-12

## 2021-06-12 RX ORDER — ORPHENADRINE CITRATE 30 MG/ML
60 INJECTION INTRAMUSCULAR; INTRAVENOUS
Status: COMPLETED | OUTPATIENT
Start: 2021-06-12 | End: 2021-06-12

## 2021-06-12 RX ORDER — METHOCARBAMOL 500 MG/1
500 TABLET, FILM COATED ORAL 3 TIMES DAILY
Qty: 15 TABLET | Refills: 0 | Status: SHIPPED | OUTPATIENT
Start: 2021-06-12 | End: 2021-06-17

## 2021-06-12 RX ORDER — LIDOCAINE 50 MG/G
1 PATCH TOPICAL DAILY
Qty: 8 PATCH | Refills: 0 | Status: SHIPPED | OUTPATIENT
Start: 2021-06-12

## 2021-06-12 RX ORDER — IBUPROFEN 600 MG/1
600 TABLET ORAL EVERY 6 HOURS PRN
Qty: 20 TABLET | Refills: 0 | Status: SHIPPED | OUTPATIENT
Start: 2021-06-12

## 2021-06-12 RX ADMIN — ORPHENADRINE CITRATE 60 MG: 60 INJECTION INTRAMUSCULAR; INTRAVENOUS at 07:06

## 2021-06-12 RX ADMIN — KETOROLAC TROMETHAMINE 15 MG: 30 INJECTION, SOLUTION INTRAMUSCULAR; INTRAVENOUS at 07:06

## 2021-06-15 ENCOUNTER — HOSPITAL ENCOUNTER (EMERGENCY)
Facility: HOSPITAL | Age: 31
Discharge: HOME | End: 2021-06-15
Attending: EMERGENCY MEDICINE

## 2021-06-15 VITALS
BODY MASS INDEX: 22.4 KG/M2 | HEIGHT: 71 IN | OXYGEN SATURATION: 99 % | WEIGHT: 160 LBS | RESPIRATION RATE: 16 BRPM | SYSTOLIC BLOOD PRESSURE: 128 MMHG | TEMPERATURE: 98 F | DIASTOLIC BLOOD PRESSURE: 78 MMHG | HEART RATE: 91 BPM

## 2021-06-15 DIAGNOSIS — G47.00 INSOMNIA, UNSPECIFIED TYPE: Primary | ICD-10-CM

## 2021-06-15 DIAGNOSIS — R11.2 NON-INTRACTABLE VOMITING WITH NAUSEA, UNSPECIFIED VOMITING TYPE: ICD-10-CM

## 2021-06-15 DIAGNOSIS — F41.9 ANXIETY: ICD-10-CM

## 2021-06-15 DIAGNOSIS — S39.012A LUMBAR STRAIN, INITIAL ENCOUNTER: ICD-10-CM

## 2021-06-15 LAB
ALBUMIN SERPL BCP-MCNC: 4.4 G/DL (ref 3.5–5.2)
ALP SERPL-CCNC: 96 U/L (ref 55–135)
ALT SERPL W/O P-5'-P-CCNC: 22 U/L (ref 10–44)
ANION GAP SERPL CALC-SCNC: 11 MMOL/L (ref 8–16)
AST SERPL-CCNC: 32 U/L (ref 10–40)
BILIRUB SERPL-MCNC: 0.4 MG/DL (ref 0.1–1)
BUN SERPL-MCNC: 22 MG/DL (ref 6–20)
CALCIUM SERPL-MCNC: 9.6 MG/DL (ref 8.7–10.5)
CHLORIDE SERPL-SCNC: 99 MMOL/L (ref 95–110)
CO2 SERPL-SCNC: 22 MMOL/L (ref 23–29)
CREAT SERPL-MCNC: 1.2 MG/DL (ref 0.5–1.4)
EST. GFR  (AFRICAN AMERICAN): >60 ML/MIN/1.73 M^2
EST. GFR  (NON AFRICAN AMERICAN): >60 ML/MIN/1.73 M^2
GLUCOSE SERPL-MCNC: 115 MG/DL (ref 70–110)
POTASSIUM SERPL-SCNC: 4.2 MMOL/L (ref 3.5–5.1)
PROT SERPL-MCNC: 8.7 G/DL (ref 6–8.4)
SODIUM SERPL-SCNC: 132 MMOL/L (ref 136–145)

## 2021-06-15 PROCEDURE — 99284 EMERGENCY DEPT VISIT MOD MDM: CPT | Mod: 25

## 2021-06-15 PROCEDURE — 80053 COMPREHEN METABOLIC PANEL: CPT | Performed by: EMERGENCY MEDICINE

## 2021-06-15 PROCEDURE — 96374 THER/PROPH/DIAG INJ IV PUSH: CPT

## 2021-06-15 PROCEDURE — 63600175 PHARM REV CODE 636 W HCPCS: Performed by: EMERGENCY MEDICINE

## 2021-06-15 PROCEDURE — 25000003 PHARM REV CODE 250: Performed by: EMERGENCY MEDICINE

## 2021-06-15 PROCEDURE — 96361 HYDRATE IV INFUSION ADD-ON: CPT

## 2021-06-15 RX ORDER — TEMAZEPAM 30 MG/1
30 CAPSULE ORAL NIGHTLY PRN
Qty: 10 CAPSULE | Refills: 0 | Status: SHIPPED | OUTPATIENT
Start: 2021-06-15 | End: 2021-07-15

## 2021-06-15 RX ORDER — ONDANSETRON 4 MG/1
4 TABLET, ORALLY DISINTEGRATING ORAL
Status: COMPLETED | OUTPATIENT
Start: 2021-06-15 | End: 2021-06-15

## 2021-06-15 RX ORDER — LORAZEPAM 1 MG/1
0.5 TABLET ORAL EVERY 6 HOURS PRN
Qty: 6 TABLET | Refills: 0 | Status: SHIPPED | OUTPATIENT
Start: 2021-06-15 | End: 2021-07-15

## 2021-06-15 RX ORDER — ONDANSETRON 4 MG/1
4 TABLET, FILM COATED ORAL EVERY 8 HOURS PRN
Qty: 12 TABLET | Refills: 0 | Status: SHIPPED | OUTPATIENT
Start: 2021-06-15

## 2021-06-15 RX ORDER — LORAZEPAM 2 MG/ML
1 INJECTION INTRAMUSCULAR
Status: COMPLETED | OUTPATIENT
Start: 2021-06-15 | End: 2021-06-15

## 2021-06-15 RX ADMIN — LORAZEPAM 1 MG: 2 INJECTION INTRAMUSCULAR; INTRAVENOUS at 09:06

## 2021-06-15 RX ADMIN — SODIUM CHLORIDE 1000 ML: 0.9 INJECTION, SOLUTION INTRAVENOUS at 09:06

## 2021-06-15 RX ADMIN — ONDANSETRON 4 MG: 4 TABLET, ORALLY DISINTEGRATING ORAL at 09:06

## 2022-11-01 NOTE — ED NOTES
Mom understands and she will call in 10 days or so if no menses occurs.    Pt states he has not vomited since given the water.

## 2023-08-07 ENCOUNTER — HOSPITAL ENCOUNTER (EMERGENCY)
Facility: HOSPITAL | Age: 33
Discharge: LEFT BEFORE DISCHARGE | End: 2023-08-07
Attending: EMERGENCY MEDICINE

## 2023-08-07 VITALS
BODY MASS INDEX: 21 KG/M2 | HEIGHT: 71 IN | TEMPERATURE: 99 F | HEART RATE: 84 BPM | SYSTOLIC BLOOD PRESSURE: 118 MMHG | WEIGHT: 150 LBS | RESPIRATION RATE: 18 BRPM | DIASTOLIC BLOOD PRESSURE: 66 MMHG | OXYGEN SATURATION: 97 %

## 2023-08-07 DIAGNOSIS — Z53.29 LEFT AGAINST MEDICAL ADVICE: Primary | ICD-10-CM

## 2023-08-07 DIAGNOSIS — R10.32 LEFT LOWER QUADRANT ABDOMINAL PAIN: ICD-10-CM

## 2023-08-07 LAB
ALBUMIN SERPL BCP-MCNC: 3.7 G/DL (ref 3.5–5.2)
ALP SERPL-CCNC: 76 U/L (ref 55–135)
ALT SERPL W/O P-5'-P-CCNC: 18 U/L (ref 10–44)
ANION GAP SERPL CALC-SCNC: 5 MMOL/L (ref 8–16)
AST SERPL-CCNC: 24 U/L (ref 10–40)
BASOPHILS # BLD AUTO: 0.01 K/UL (ref 0–0.2)
BASOPHILS NFR BLD: 0.1 % (ref 0–1.9)
BILIRUB SERPL-MCNC: 0.7 MG/DL (ref 0.1–1)
BILIRUB UR QL STRIP: NEGATIVE
BUN SERPL-MCNC: 18 MG/DL (ref 6–20)
CALCIUM SERPL-MCNC: 8.8 MG/DL (ref 8.7–10.5)
CHLORIDE SERPL-SCNC: 103 MMOL/L (ref 95–110)
CK SERPL-CCNC: 414 U/L (ref 20–200)
CLARITY UR: CLEAR
CO2 SERPL-SCNC: 28 MMOL/L (ref 23–29)
COLOR UR: YELLOW
CREAT SERPL-MCNC: 1 MG/DL (ref 0.5–1.4)
DIFFERENTIAL METHOD: ABNORMAL
EOSINOPHIL # BLD AUTO: 0 K/UL (ref 0–0.5)
EOSINOPHIL NFR BLD: 0.2 % (ref 0–8)
ERYTHROCYTE [DISTWIDTH] IN BLOOD BY AUTOMATED COUNT: 13.2 % (ref 11.5–14.5)
EST. GFR  (NO RACE VARIABLE): >60 ML/MIN/1.73 M^2
GLUCOSE SERPL-MCNC: 115 MG/DL (ref 70–110)
GLUCOSE UR QL STRIP: ABNORMAL
HCT VFR BLD AUTO: 45.2 % (ref 40–54)
HGB BLD-MCNC: 15 G/DL (ref 14–18)
HGB UR QL STRIP: NEGATIVE
IMM GRANULOCYTES # BLD AUTO: 0.01 K/UL (ref 0–0.04)
IMM GRANULOCYTES NFR BLD AUTO: 0.1 % (ref 0–0.5)
KETONES UR QL STRIP: ABNORMAL
LEUKOCYTE ESTERASE UR QL STRIP: NEGATIVE
LIPASE SERPL-CCNC: 14 U/L (ref 4–60)
LYMPHOCYTES # BLD AUTO: 1 K/UL (ref 1–4.8)
LYMPHOCYTES NFR BLD: 11.2 % (ref 18–48)
MCH RBC QN AUTO: 29.6 PG (ref 27–31)
MCHC RBC AUTO-ENTMCNC: 33.2 G/DL (ref 32–36)
MCV RBC AUTO: 89 FL (ref 82–98)
MONOCYTES # BLD AUTO: 0.8 K/UL (ref 0.3–1)
MONOCYTES NFR BLD: 9.4 % (ref 4–15)
NEUTROPHILS # BLD AUTO: 7.1 K/UL (ref 1.8–7.7)
NEUTROPHILS NFR BLD: 79 % (ref 38–73)
NITRITE UR QL STRIP: NEGATIVE
NRBC BLD-RTO: 0 /100 WBC
PH UR STRIP: 6 [PH] (ref 5–8)
PLATELET # BLD AUTO: 139 K/UL (ref 150–450)
PMV BLD AUTO: 12.5 FL (ref 9.2–12.9)
POTASSIUM SERPL-SCNC: 3.7 MMOL/L (ref 3.5–5.1)
PROT SERPL-MCNC: 7.1 G/DL (ref 6–8.4)
PROT UR QL STRIP: ABNORMAL
RBC # BLD AUTO: 5.06 M/UL (ref 4.6–6.2)
SODIUM SERPL-SCNC: 136 MMOL/L (ref 136–145)
SP GR UR STRIP: >1.03 (ref 1–1.03)
URN SPEC COLLECT METH UR: ABNORMAL
UROBILINOGEN UR STRIP-ACNC: NEGATIVE EU/DL
WBC # BLD AUTO: 8.95 K/UL (ref 3.9–12.7)

## 2023-08-07 PROCEDURE — 96361 HYDRATE IV INFUSION ADD-ON: CPT

## 2023-08-07 PROCEDURE — 63600175 PHARM REV CODE 636 W HCPCS

## 2023-08-07 PROCEDURE — 80053 COMPREHEN METABOLIC PANEL: CPT | Performed by: EMERGENCY MEDICINE

## 2023-08-07 PROCEDURE — 83690 ASSAY OF LIPASE: CPT | Performed by: EMERGENCY MEDICINE

## 2023-08-07 PROCEDURE — 85025 COMPLETE CBC W/AUTO DIFF WBC: CPT | Performed by: EMERGENCY MEDICINE

## 2023-08-07 PROCEDURE — 99284 EMERGENCY DEPT VISIT MOD MDM: CPT | Mod: 25

## 2023-08-07 PROCEDURE — 25000003 PHARM REV CODE 250: Performed by: EMERGENCY MEDICINE

## 2023-08-07 PROCEDURE — 82550 ASSAY OF CK (CPK): CPT | Performed by: EMERGENCY MEDICINE

## 2023-08-07 PROCEDURE — 81003 URINALYSIS AUTO W/O SCOPE: CPT | Performed by: EMERGENCY MEDICINE

## 2023-08-07 PROCEDURE — 96374 THER/PROPH/DIAG INJ IV PUSH: CPT

## 2023-08-07 RX ORDER — KETOROLAC TROMETHAMINE 30 MG/ML
15 INJECTION, SOLUTION INTRAMUSCULAR; INTRAVENOUS
Status: COMPLETED | OUTPATIENT
Start: 2023-08-07 | End: 2023-08-07

## 2023-08-07 RX ADMIN — SODIUM CHLORIDE 1000 ML: 9 INJECTION, SOLUTION INTRAVENOUS at 07:08

## 2023-08-07 RX ADMIN — KETOROLAC TROMETHAMINE 15 MG: 30 INJECTION, SOLUTION INTRAMUSCULAR; INTRAVENOUS at 08:08

## 2023-08-07 NOTE — LETTER
Patient: Emy Baig  YOB: 1990  Date: 8/7/2023 Time: 8:58 PM  Location: [facility]    Leaving the Hospital Against Medical Advice    Chart #:[id number]    This will certify that I, the undersigned,    ______________________________________________________________________    A patient in the above named medical center, having requested discharge and removal from the medical center against the advice of my attending physician(s), hereby release [facility], its physicians, officers and employees, severally and individually, from any and all liability of any nature whatsoever for any injury or harm or complication of any kind that may result directly or indirectly, by reason of my terminating my stay as a patient at [facility] and my departure from [facility], and hereby waive any and all rights of action I may now have or later acquire as a result of my voluntary departure from [facility] and the termination of my stay as a patient therein.    This release is made with the full knowledge of the danger that may result from the action which I am taking.      Date:_______________________                         ___________________________                                                                                    Patient/Legal Representative    Witness:        ____________________________                          ___________________________  Nurse                                                                        Physician

## 2023-08-08 NOTE — ED TRIAGE NOTES
Serg Baig is a 32 y.o male to ED c/o epigastric abdominal pain that radiates to LLQ x3 days. Generalized HA and diarrhea x2 days.  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  Patient thinks that he is dehydrated.  Denies chest pain, n/v, or SOB.

## 2023-08-08 NOTE — ED PROVIDER NOTES
Encounter Date: 8/7/2023       History     Chief Complaint   Patient presents with    Abdominal Pain     Pt c/o abdominal pain accompanied by diarrhea x 2 days. Pt denies cp, sob, n/v.      Patient is a 32 year old male with no PMHx presenting to the ED with chief complaint of generalized abdominal pain and diarrhea x2 days. He states the pain is episodic nature. He rates the pain 8/10 and describes it as a sharp pain. The pain is worsened with laying flat. The pain is relieved with leaning forward. He has experienced abdominal pain similar to this in the past. He states he has been seen in the ED for the pain but states he never got an answer to what's causing the pain. He has not attempted treatment for the pain. He has also been experiencing diarrhea since the pain has started. He notes that he has had 5 episodes of diarrhea. He denies melena or hematochezia. He has not attempted treatment for the diarrhea. The patients last complaint is a frontal headache. He states this started yesterday. He rates the pain 10/10. There are no alleviating factors. He states the pain is worsened with light and sound. He took ibuprofen for the pain with no relief noted. He states he has had migraines in the past and states this feels similar to previous episodes.He denies chest pain, SOB, nausea, vomiting, melena, hematochezia, flank pain, or urinary symptoms.      Review of patient's allergies indicates:  No Known Allergies  History reviewed. No pertinent past medical history.  History reviewed. No pertinent surgical history.  History reviewed. No pertinent family history.  Social History     Tobacco Use    Smoking status: Every Day     Current packs/day: 0.50     Types: Cigarettes    Smokeless tobacco: Never   Substance Use Topics    Alcohol use: Not Currently    Drug use: Never     Review of Systems   Constitutional:  Positive for appetite change. Negative for activity change, fatigue and fever.   HENT:  Negative for sore  throat.    Respiratory:  Negative for shortness of breath.    Cardiovascular:  Negative for chest pain.   Gastrointestinal:  Positive for abdominal pain and diarrhea. Negative for abdominal distention, blood in stool, constipation, nausea and vomiting.   Genitourinary:  Negative for dysuria, flank pain, frequency, hematuria and urgency.   Musculoskeletal:  Negative for back pain.   Skin:  Negative for rash.   Neurological:  Positive for headaches. Negative for dizziness, facial asymmetry, weakness, light-headedness and numbness.   Hematological:  Does not bruise/bleed easily.       Physical Exam     Initial Vitals [08/07/23 1803]   BP Pulse Resp Temp SpO2   118/66 84 18 98.6 °F (37 °C) 97 %      MAP       --         Physical Exam    Nursing note and vitals reviewed.  Constitutional: Vital signs are normal. He appears well-developed and well-nourished. He is not diaphoretic. He is active. He does not appear ill. No distress.   HENT:   Head: Normocephalic and atraumatic.   Right Ear: External ear normal.   Left Ear: External ear normal.   Nose: Nose normal.   Eyes: Conjunctivae, EOM and lids are normal. Pupils are equal, round, and reactive to light.   Neck: Neck supple. No thyromegaly present. No tracheal deviation present.   Normal range of motion.   Full passive range of motion without pain.     Cardiovascular:  Normal rate, regular rhythm and normal heart sounds.     Exam reveals no gallop and no friction rub.       No murmur heard.  Pulmonary/Chest: Effort normal and breath sounds normal. No respiratory distress. He has no wheezes. He has no rhonchi. He has no rales.   Abdominal: Bowel sounds are normal. He exhibits no distension. There is abdominal tenderness in the epigastric area and left lower quadrant.   No right CVA tenderness.  No left CVA tenderness. There is guarding. There is no rebound, no tenderness at McBurney's point and negative Stearns's sign. negative Rovsing's sign  Musculoskeletal:          General: Normal range of motion.      Cervical back: Full passive range of motion without pain, normal range of motion and neck supple.     Neurological: He is alert and oriented to person, place, and time. He has normal strength. GCS score is 15. GCS eye subscore is 4. GCS verbal subscore is 5. GCS motor subscore is 6.   Skin: Skin is warm and dry. Capillary refill takes less than 2 seconds. No erythema.   Psychiatric: He has a normal mood and affect. His behavior is normal. Thought content normal.         ED Course   Procedures  Labs Reviewed   CBC W/ AUTO DIFFERENTIAL - Abnormal; Notable for the following components:       Result Value    Platelets 139 (*)     Gran % 79.0 (*)     Lymph % 11.2 (*)     All other components within normal limits   COMPREHENSIVE METABOLIC PANEL - Abnormal; Notable for the following components:    Glucose 115 (*)     Anion Gap 5 (*)     All other components within normal limits   URINALYSIS, REFLEX TO URINE CULTURE - Abnormal; Notable for the following components:    Specific Gravity, UA >1.030 (*)     Protein, UA Trace (*)     Glucose, UA Trace (*)     Ketones, UA Trace (*)     All other components within normal limits    Narrative:     Specimen Source->Urine   CK - Abnormal; Notable for the following components:     (*)     All other components within normal limits   LIPASE          Imaging Results    None          Medications   sodium chloride 0.9% bolus 1,000 mL 1,000 mL (0 mLs Intravenous Stopped 8/7/23 2055)   ketorolac injection 15 mg (15 mg Intravenous Given 8/7/23 2012)     Medical Decision Making:   Initial Assessment:   32 year old male with no PMHx presenting to the ED with chief complaint of generalized abdominal pain.  Patient's chart and medical history reviewed.  Differential Diagnosis:   Constipation  Diverticulitis   SBO  Colitis  Appendicitis  Gastritis  Clinical Tests:   Lab Tests: Ordered and Reviewed  Radiological Study: Reviewed and Ordered  ED  Management:  Patient's vitals reviewed.  He is afebrile, no respiratory distress, nontoxic-appearing in the ED. Patient had LLQ TTP.  Patient started on fluids and given Toradol for pain.  CBC and CMP unremarkable.  Lipase in normal range, pancreatitis unlikely.  CPK slightly elevated at 414, unlikely rhabdo.  UA unremarkable for infection.  Patient states he does not want to wait for CT scan and wants to leave AMA.  Patient signed out AMA.  Patient stable at time of AMA.                          Clinical Impression:   Final diagnoses:  [R10.32] Left lower quadrant abdominal pain  [Z53.29] Left against medical advice (Primary)        ED Disposition Condition    AMA Stable                Holdsworth, Alayna, PA-C  08/07/23 5279

## 2024-04-20 ENCOUNTER — HOSPITAL ENCOUNTER (EMERGENCY)
Facility: HOSPITAL | Age: 34
Discharge: HOME | End: 2024-04-20
Attending: STUDENT IN AN ORGANIZED HEALTH CARE EDUCATION/TRAINING PROGRAM

## 2024-04-20 VITALS
SYSTOLIC BLOOD PRESSURE: 176 MMHG | DIASTOLIC BLOOD PRESSURE: 91 MMHG | HEART RATE: 50 BPM | TEMPERATURE: 98 F | WEIGHT: 160 LBS | OXYGEN SATURATION: 100 % | RESPIRATION RATE: 18 BRPM | BODY MASS INDEX: 22.32 KG/M2

## 2024-04-20 DIAGNOSIS — R10.9 ABDOMINAL PAIN, UNSPECIFIED ABDOMINAL LOCATION: Primary | ICD-10-CM

## 2024-04-20 LAB
ALBUMIN SERPL BCP-MCNC: 4.3 G/DL (ref 3.5–5.2)
ALLENS TEST: ABNORMAL
ALP SERPL-CCNC: 88 U/L (ref 55–135)
ALT SERPL W/O P-5'-P-CCNC: 27 U/L (ref 10–44)
AMPHET+METHAMPHET UR QL: ABNORMAL
ANION GAP SERPL CALC-SCNC: 15 MMOL/L (ref 8–16)
ANION GAP SERPL CALC-SCNC: 8 MMOL/L (ref 8–16)
AST SERPL-CCNC: 25 U/L (ref 10–40)
BARBITURATES UR QL SCN>200 NG/ML: NEGATIVE
BASOPHILS # BLD AUTO: 0.02 K/UL (ref 0–0.2)
BASOPHILS NFR BLD: 0.3 % (ref 0–1.9)
BENZODIAZ UR QL SCN>200 NG/ML: NEGATIVE
BILIRUB SERPL-MCNC: 0.4 MG/DL (ref 0.1–1)
BILIRUB UR QL STRIP: NEGATIVE
BUN SERPL-MCNC: 12 MG/DL (ref 6–20)
BUN SERPL-MCNC: 12 MG/DL (ref 6–30)
BZE UR QL SCN: NEGATIVE
CALCIUM SERPL-MCNC: 9.8 MG/DL (ref 8.7–10.5)
CANNABINOIDS UR QL SCN: ABNORMAL
CHLORIDE SERPL-SCNC: 101 MMOL/L (ref 95–110)
CHLORIDE SERPL-SCNC: 99 MMOL/L (ref 95–110)
CLARITY UR: CLEAR
CO2 SERPL-SCNC: 29 MMOL/L (ref 23–29)
COLOR UR: YELLOW
CREAT SERPL-MCNC: 1.1 MG/DL (ref 0.5–1.4)
CREAT SERPL-MCNC: 1.2 MG/DL (ref 0.5–1.4)
CREAT UR-MCNC: 190.5 MG/DL (ref 23–375)
DELSYS: ABNORMAL
DIFFERENTIAL METHOD BLD: ABNORMAL
EOSINOPHIL # BLD AUTO: 0 K/UL (ref 0–0.5)
EOSINOPHIL NFR BLD: 0.1 % (ref 0–8)
ERYTHROCYTE [DISTWIDTH] IN BLOOD BY AUTOMATED COUNT: 12.4 % (ref 11.5–14.5)
EST. GFR  (NO RACE VARIABLE): >60 ML/MIN/1.73 M^2
GLUCOSE SERPL-MCNC: 127 MG/DL (ref 70–110)
GLUCOSE SERPL-MCNC: 129 MG/DL (ref 70–110)
GLUCOSE UR QL STRIP: NEGATIVE
HCT VFR BLD AUTO: 48.8 % (ref 40–54)
HCT VFR BLD CALC: 49 %PCV (ref 36–54)
HGB BLD-MCNC: 16.3 G/DL (ref 14–18)
HGB UR QL STRIP: NEGATIVE
IMM GRANULOCYTES # BLD AUTO: 0.01 K/UL (ref 0–0.04)
IMM GRANULOCYTES NFR BLD AUTO: 0.1 % (ref 0–0.5)
KETONES UR QL STRIP: NEGATIVE
LEUKOCYTE ESTERASE UR QL STRIP: NEGATIVE
LIPASE SERPL-CCNC: 58 U/L (ref 4–60)
LYMPHOCYTES # BLD AUTO: 1.3 K/UL (ref 1–4.8)
LYMPHOCYTES NFR BLD: 19.2 % (ref 18–48)
MCH RBC QN AUTO: 30 PG (ref 27–31)
MCHC RBC AUTO-ENTMCNC: 33.4 G/DL (ref 32–36)
MCV RBC AUTO: 90 FL (ref 82–98)
METHADONE UR QL SCN>300 NG/ML: NEGATIVE
MONOCYTES # BLD AUTO: 0.2 K/UL (ref 0.3–1)
MONOCYTES NFR BLD: 2.8 % (ref 4–15)
NEUTROPHILS # BLD AUTO: 5.3 K/UL (ref 1.8–7.7)
NEUTROPHILS NFR BLD: 77.5 % (ref 38–73)
NITRITE UR QL STRIP: NEGATIVE
NRBC BLD-RTO: 0 /100 WBC
OPIATES UR QL SCN: NEGATIVE
PCP UR QL SCN>25 NG/ML: NEGATIVE
PH UR STRIP: 7 [PH] (ref 5–8)
PLATELET # BLD AUTO: 150 K/UL (ref 150–450)
PMV BLD AUTO: 12.4 FL (ref 9.2–12.9)
POC IONIZED CALCIUM: 1.25 MMOL/L (ref 1.06–1.42)
POC TCO2 (MEASURED): 29 MMOL/L (ref 23–29)
POTASSIUM BLD-SCNC: 4 MMOL/L (ref 3.5–5.1)
POTASSIUM SERPL-SCNC: 4.3 MMOL/L (ref 3.5–5.1)
PROT SERPL-MCNC: 8.4 G/DL (ref 6–8.4)
PROT UR QL STRIP: NEGATIVE
RBC # BLD AUTO: 5.44 M/UL (ref 4.6–6.2)
SAMPLE: ABNORMAL
SITE: ABNORMAL
SODIUM BLD-SCNC: 138 MMOL/L (ref 136–145)
SODIUM SERPL-SCNC: 138 MMOL/L (ref 136–145)
SP GR UR STRIP: >1.03 (ref 1–1.03)
TOXICOLOGY INFORMATION: ABNORMAL
URN SPEC COLLECT METH UR: ABNORMAL
UROBILINOGEN UR STRIP-ACNC: NEGATIVE EU/DL
WBC # BLD AUTO: 6.88 K/UL (ref 3.9–12.7)

## 2024-04-20 PROCEDURE — 82565 ASSAY OF CREATININE: CPT

## 2024-04-20 PROCEDURE — C9113 INJ PANTOPRAZOLE SODIUM, VIA: HCPCS | Performed by: STUDENT IN AN ORGANIZED HEALTH CARE EDUCATION/TRAINING PROGRAM

## 2024-04-20 PROCEDURE — 99900035 HC TECH TIME PER 15 MIN (STAT)

## 2024-04-20 PROCEDURE — 85014 HEMATOCRIT: CPT

## 2024-04-20 PROCEDURE — 82330 ASSAY OF CALCIUM: CPT

## 2024-04-20 PROCEDURE — 25500020 PHARM REV CODE 255: Performed by: STUDENT IN AN ORGANIZED HEALTH CARE EDUCATION/TRAINING PROGRAM

## 2024-04-20 PROCEDURE — 85025 COMPLETE CBC W/AUTO DIFF WBC: CPT | Performed by: STUDENT IN AN ORGANIZED HEALTH CARE EDUCATION/TRAINING PROGRAM

## 2024-04-20 PROCEDURE — 84295 ASSAY OF SERUM SODIUM: CPT

## 2024-04-20 PROCEDURE — 83690 ASSAY OF LIPASE: CPT | Performed by: STUDENT IN AN ORGANIZED HEALTH CARE EDUCATION/TRAINING PROGRAM

## 2024-04-20 PROCEDURE — 63600175 PHARM REV CODE 636 W HCPCS: Performed by: STUDENT IN AN ORGANIZED HEALTH CARE EDUCATION/TRAINING PROGRAM

## 2024-04-20 PROCEDURE — 80053 COMPREHEN METABOLIC PANEL: CPT | Performed by: STUDENT IN AN ORGANIZED HEALTH CARE EDUCATION/TRAINING PROGRAM

## 2024-04-20 PROCEDURE — 80307 DRUG TEST PRSMV CHEM ANLYZR: CPT | Performed by: STUDENT IN AN ORGANIZED HEALTH CARE EDUCATION/TRAINING PROGRAM

## 2024-04-20 PROCEDURE — 99285 EMERGENCY DEPT VISIT HI MDM: CPT | Mod: 25

## 2024-04-20 PROCEDURE — 81003 URINALYSIS AUTO W/O SCOPE: CPT | Mod: 59 | Performed by: STUDENT IN AN ORGANIZED HEALTH CARE EDUCATION/TRAINING PROGRAM

## 2024-04-20 PROCEDURE — 84132 ASSAY OF SERUM POTASSIUM: CPT

## 2024-04-20 PROCEDURE — 82962 GLUCOSE BLOOD TEST: CPT

## 2024-04-20 PROCEDURE — 96374 THER/PROPH/DIAG INJ IV PUSH: CPT

## 2024-04-20 RX ORDER — PANTOPRAZOLE SODIUM 40 MG/10ML
40 INJECTION, POWDER, LYOPHILIZED, FOR SOLUTION INTRAVENOUS
Status: COMPLETED | OUTPATIENT
Start: 2024-04-20 | End: 2024-04-20

## 2024-04-20 RX ORDER — ONDANSETRON 4 MG/1
4 TABLET, ORALLY DISINTEGRATING ORAL 2 TIMES DAILY
Qty: 6 TABLET | Refills: 0 | Status: ON HOLD | OUTPATIENT
Start: 2024-04-20 | End: 2024-04-29 | Stop reason: HOSPADM

## 2024-04-20 RX ADMIN — IOHEXOL 75 ML: 350 INJECTION, SOLUTION INTRAVENOUS at 09:04

## 2024-04-20 RX ADMIN — PANTOPRAZOLE SODIUM 40 MG: 40 INJECTION, POWDER, FOR SOLUTION INTRAVENOUS at 08:04

## 2024-04-21 NOTE — ED PROVIDER NOTES
Encounter Date: 4/20/2024    SCRIBE #1 NOTE: I, HAILE LINDO, am scribing for, and in the presence of,  Steve Stewart PA-C. I have scribed the following portions of the note - Other sections scribed: HPI, ROS.       History     Chief Complaint   Patient presents with    Abdominal Pain     Since yesterday but pain has been going on for a while seen at  earlier and discharged, started to throw up blood      33-year-old male, with a PMHx of cannabinoid hyperemesis syndrome, presents to the ED with a chief complaint of abdominal pain for a few days. Pt states that he has been having generalized abdominal pain with associated nausea and emesis (five episodes). He further states that he had one episode of hematemesis. He was seen at [facility] earlier today for the same complaint and was discharged after receiving an XR and IV fluids. He is not currently followed by GI. Endorses smoking cigarettes and marijuana (seldomly) and using ecstasy (last use two weeks ago). No other exacerbating or alleviating factors. Denies any fever, constipation, diarrhea, or other associated symptoms.     The history is provided by the patient. No  was used.     Review of patient's allergies indicates:  No Known Allergies  Past Medical History:   Diagnosis Date    Cannabinoid hyperemesis syndrome      No past surgical history on file.  No family history on file.  Social History     Tobacco Use    Smoking status: Every Day     Current packs/day: 0.50     Types: Cigarettes    Smokeless tobacco: Never   Substance Use Topics    Alcohol use: Not Currently    Drug use: Yes     Types: Marijuana     Comment: also reports use of ecstasy 2 weeks ago     Review of Systems   Constitutional:  Negative for chills, fatigue and fever.   HENT:  Negative for congestion, hearing loss, sore throat and trouble swallowing.    Eyes:  Negative for visual disturbance.   Respiratory:  Negative for cough, chest tightness and shortness of breath.     Cardiovascular:  Negative for chest pain.   Gastrointestinal:  Positive for abdominal pain, nausea and vomiting. Negative for constipation and diarrhea.   Endocrine: Negative for polyuria.   Genitourinary:  Negative for difficulty urinating.   Musculoskeletal:  Negative for arthralgias and myalgias.   Skin:  Negative for rash.   Neurological:  Negative for dizziness and headaches.   Psychiatric/Behavioral:  The patient is not nervous/anxious.        Physical Exam     Initial Vitals [04/20/24 1947]   BP Pulse Resp Temp SpO2   (!) 147/87 (!) 51 18 97.5 °F (36.4 °C) 100 %      MAP       --         Physical Exam    Nursing note and vitals reviewed.  Constitutional: He appears well-developed and well-nourished.   HENT:   Head: Normocephalic and atraumatic.   Eyes: Conjunctivae and EOM are normal.   Neck: Neck supple.   Cardiovascular:  Normal rate, regular rhythm, normal heart sounds and intact distal pulses.           Pulmonary/Chest: Breath sounds normal. No respiratory distress.   Abdominal:   Exam significant for midepigastric abdominal tenderness    Abdomen otherwise is nondistended, soft and nontender in all other quadrants. Normoactive bowel sounds. Nonperitoneal, no guarding or rigidity. No palpable masses or hepatosplenomegaly.  No suprapubic pain. No CVAT.    No overlying skin changes.   Distal pulses 2+ b/l.     Additional Tests //  (-)  Stearns's sign.   (-)  Rebound Tenderness  (-)  Psoas Sign  (-)  Heel Tap     Musculoskeletal:         General: Normal range of motion.      Cervical back: Neck supple.     Neurological: He is alert and oriented to person, place, and time. GCS score is 15. GCS eye subscore is 4. GCS verbal subscore is 5. GCS motor subscore is 6.   Skin: Skin is warm and dry. Capillary refill takes less than 2 seconds.   Psychiatric: He has a normal mood and affect. His behavior is normal. Judgment and thought content normal.         ED Course   Procedures  Labs Reviewed   CBC W/ AUTO  DIFFERENTIAL - Abnormal; Notable for the following components:       Result Value    Mono # 0.2 (*)     Gran % 77.5 (*)     Mono % 2.8 (*)     All other components within normal limits   COMPREHENSIVE METABOLIC PANEL - Abnormal; Notable for the following components:    Glucose 127 (*)     All other components within normal limits   URINALYSIS, REFLEX TO URINE CULTURE - Abnormal; Notable for the following components:    Specific Gravity, UA >1.030 (*)     All other components within normal limits    Narrative:     Specimen Source->Urine   DRUG SCREEN PANEL, URINE EMERGENCY - Abnormal; Notable for the following components:    Amphetamine Screen, Ur Presumptive Positive (*)     THC Presumptive Positive (*)     All other components within normal limits    Narrative:     Specimen Source->Urine   ISTAT PROCEDURE - Abnormal; Notable for the following components:    POC Glucose 129 (*)     All other components within normal limits   LIPASE          Imaging Results              CT Abdomen Pelvis With IV Contrast NO Oral Contrast (Final result)  Result time 04/20/24 21:30:35      Final result by Meliton Vuong MD (04/20/24 21:30:35)                   Impression:      No acute intra-abdominal abnormalities identified.      Electronically signed by: Meliton Vuong MD  Date:    04/20/2024  Time:    21:30               Narrative:    EXAMINATION:  CT ABDOMEN PELVIS WITH IV CONTRAST    CLINICAL HISTORY:  Abdominal pain, acute, nonlocalized;    TECHNIQUE:  Low dose axial images, sagittal and coronal reformations were obtained from the lung bases to the pubic symphysis following the IV administration of 75 mL of Omnipaque 350 .  Oral contrast was not given.    COMPARISON:  CT abdomen and pelvis from November 2020.    FINDINGS:  The visualized portion of the heart is unremarkable.  The lung bases are clear.    No significant hepatic abnormalities are identified.  There is no intra-or extrahepatic biliary ductal dilatation.  The  gallbladder is unremarkable.  The stomach, pancreas, spleen, and adrenal glands are unremarkable.    Kidneys enhance normally with no evidence of hydronephrosis.  Early excretion of contrast is seen.  Ureters are unable to be tracked.  Urinary bladder is nondistended.  Prostate is unremarkable.    Appendix is not clearly visualized, however no abnormalities or inflammatory changes are appreciated in the region.  No evidence of bowel obstruction.  Moderate volume retained stool is seen in the colon.  No free air or free fluid.    Aorta tapers normally.    No acute osseous abnormality identified. Subcutaneous soft tissues show no significant abnormalities.                                       Medications   pantoprazole injection 40 mg (40 mg Intravenous Given 4/20/24 2045)   iohexoL (OMNIPAQUE 350) injection 75 mL (75 mLs Intravenous Given 4/20/24 2110)     Medical Decision Making  33-year-old male with a history of polysubstance abuse, cannabis hyperemesis syndrome who was recently evaluated at outside facility earlier today presents for re-evaluation of abdominal pain with nausea and vomiting.  On initial presentation, patient is nontoxic and well-appearing does have some reproducible midepigastric abdominal tenderness.  Given the presentation, will initiate further workup with labs, CBC, CMP, lipase, urinalysis/UDS, CT abdomen pelvis.  Differential includes cholecystitis, choledocholithiasis, cholangitis, pancreatitis, gastritis/gastroenteritis.  Certainly consider UGIB given episode of reported hematemesis.  Workup today without significant anemia, no significant metabolic abnormality.  Normal lipase, CT abdomen pelvis without acute intra-abdominal pathology.  Doubt the but pathology.  Patient has not had any episodes of vomiting in the emergency room.  He does have a low July-Blatchford bleeding risk.  Suspect symptoms are secondary to cannabis hyperemesis versus viral gastroenteritis.  Remains  hemodynamically stable.     Given patient presentation and workup, doubt emergent or surgical pathology necessitating consultation or admission from the emergency department.  I discussed the findings with the patient.  I discussed strict and strong return precautions. They will be discharged home in stable condition.      Amount and/or Complexity of Data Reviewed  Labs: ordered. Decision-making details documented in ED Course.  Radiology: ordered. Decision-making details documented in ED Course.    Risk  Prescription drug management.            Scribe Attestation:   Scribe #1: I performed the above scribed service and the documentation accurately describes the services I performed. I attest to the accuracy of the note.        ED Course as of 04/21/24 0140   Sat Apr 20, 2024 2015 BP(!): 147/87 [AN]   2015 Temp: 97.5 °F (36.4 °C) [AN]   2016 Pulse(!): 51 [AN]   2016 Resp: 18 [AN]   2016 SpO2: 100 % [AN]   2106 CBC W/ AUTO DIFFERENTIAL(!)  No leukocytosis.  No anemia.  Normal platelets. [AN]   2106 Comp. Metabolic Panel(!)  No significant metabolic abnormality.  Normal hepatic and renal function. [AN]   2107 Lipase [AN]   2201 CT Abdomen Pelvis With IV Contrast NO Oral Contrast  No acute intra-abdominal abnormalities identified. [AN]   2210 Urinalysis, Reflex to Urine Culture Urine, Clean Catch(!)  No convincing evidence of urinary tract infection. [AN]   2239 Amphetamines, Urine(!): Presumptive Positive [AN]   2239 Marijuana (THC) Metabolite(!): Presumptive Positive [AN]   2244 [place]-Blatchford Bleeding Risk 0.  [AN]      ED Course User Index  [AN] Steve Stewart PA-C                         Scribe attestation: I, Steve Stewart PA-C, personally performed the services described in this documentation. All medical record entries made by the scribe were at my direction and in my presence.  I have reviewed the chart and agree that the record reflects my personal performance and is accurate and complete.   Clinical  Impression:  Final diagnoses:  [R10.9] Abdominal pain, unspecified abdominal location (Primary)          ED Disposition Condition    Discharge Stable          ED Prescriptions       Medication Sig Dispense Start Date End Date Auth. Provider    ondansetron (ZOFRAN-ODT) 4 MG TbDL Take 1 tablet (4 mg total) by mouth 2 (two) times daily. 6 tablet 4/20/2024 -- Steve Stewart PA-C          Follow-up Information       Follow up With Specialties Details Why Contact [facility] - Emergency Dept Emergency Medicine Go to  As needed, If symptoms worsen [address] [place], [place] [address] [facility] [place] [address] [phone]    Primary Care Manager  Schedule an appointment as soon as possible for a visit in 1 week               Steve Stewart PA-C  04/21/24 0140

## 2024-04-21 NOTE — ED NOTES
Pt denies having to urinate at this time. Given urine cup and is aware of need for urine sample. Provider aware.

## 2024-04-21 NOTE — ED TRIAGE NOTES
Emy Baig, a 33 y.o. male presents to the ED w/ complaint of generalized abdominal pain accompanied with vomiting. Pt noted to be withering around in pain. Discharge from [facility] today with cannabinoid hyperemesis syndrome. Pt is AAOX4.

## 2024-04-21 NOTE — DISCHARGE INSTRUCTIONS
You were evaluated and treated in the emergency department today. You were found to have a diagnoses that can be managed well at home, however that requires your commitment to getting better.    Diagnoses specific instructions:  Follow-up with primary care provider.  Return to the emergency room for any new or worsening symptoms      If you received or are discharged with pain medicine or muscle relaxers, understand that they can make you sleepy or impair your judgement. Do not make important decisions, drink, drive, swim or perform any other tasks you would not otherwise perform while impaired for at least 24 hours after your last dose.      Ensure you follow up with your Primary Care Provider or any additional providers listed on this discharge sheet. While you may be healthy enough to go home today, I cannot predict the exact course of your diagnoses. It is important to remember that some problems are difficult to diagnose and may not be found during your first visit. As such, it is your responsibility to monitor symptoms, follow-up with another healthcare provider, or return to the emergency room for new or worsening concerns. Unless otherwise instructed, continue all home medications and any new medications prescribed to you in the Emergency Department.     General Maintenance for otherwise healthy adults: Ensure adequate hydration to prevent prolonged illness and recovery. This should include about 14-16 cups of water daily.  Monitor your caloric intake with a goal of obtaining and maintaining a healthy weight and BMI to help prevent the development of chronic and life-threatening medical conditions. Talk with your primary care provider about how to start healthy fitness habits and aim for a goal of 30 minutes to an hour of exercise 3-5 times a week. Avoid the use of tobacco, alcohol, and illicit drugs as these may be detrimental to your health goals.

## 2024-04-27 ENCOUNTER — HOSPITAL ENCOUNTER (OUTPATIENT)
Facility: HOSPITAL | Age: 34
Discharge: HOME | End: 2024-04-29
Attending: EMERGENCY MEDICINE | Admitting: STUDENT IN AN ORGANIZED HEALTH CARE EDUCATION/TRAINING PROGRAM

## 2024-04-27 DIAGNOSIS — R07.9 CHEST PAIN: ICD-10-CM

## 2024-04-27 DIAGNOSIS — K92.2 UGIB (UPPER GASTROINTESTINAL BLEED): Primary | ICD-10-CM

## 2024-04-27 DIAGNOSIS — R10.9 ABDOMINAL PAIN: ICD-10-CM

## 2024-04-27 PROBLEM — R79.89 ELEVATED TROPONIN: Status: ACTIVE | Noted: 2024-04-27

## 2024-04-27 LAB
ABO + RH BLD: NORMAL
ALBUMIN SERPL BCP-MCNC: 3.9 G/DL (ref 3.5–5.2)
ALP SERPL-CCNC: 69 U/L (ref 55–135)
ALT SERPL W/O P-5'-P-CCNC: 27 U/L (ref 10–44)
AMPHET+METHAMPHET UR QL: NEGATIVE
ANION GAP SERPL CALC-SCNC: 6 MMOL/L (ref 8–16)
ASCENDING AORTA: 2.78 CM
AST SERPL-CCNC: 29 U/L (ref 10–40)
AV INDEX (PROSTH): 0.95
AV MEAN GRADIENT: 5 MMHG
AV PEAK GRADIENT: 9 MMHG
AV VALVE AREA BY VELOCITY RATIO: 3.89 CM²
AV VALVE AREA: 4.08 CM²
AV VELOCITY RATIO: 0.91
BARBITURATES UR QL SCN>200 NG/ML: NEGATIVE
BASOPHILS # BLD AUTO: 0.02 K/UL (ref 0–0.2)
BASOPHILS NFR BLD: 0.3 % (ref 0–1.9)
BENZODIAZ UR QL SCN>200 NG/ML: NEGATIVE
BILIRUB SERPL-MCNC: 0.4 MG/DL (ref 0.1–1)
BILIRUB UR QL STRIP: NEGATIVE
BLD GP AB SCN CELLS X3 SERPL QL: NORMAL
BSA FOR ECHO PROCEDURE: 1.91 M2
BUN SERPL-MCNC: 15 MG/DL (ref 6–20)
BZE UR QL SCN: NEGATIVE
CALCIUM SERPL-MCNC: 9.2 MG/DL (ref 8.7–10.5)
CANNABINOIDS UR QL SCN: NEGATIVE
CHLORIDE SERPL-SCNC: 100 MMOL/L (ref 95–110)
CLARITY UR: ABNORMAL
CO2 SERPL-SCNC: 28 MMOL/L (ref 23–29)
COLOR UR: YELLOW
CREAT SERPL-MCNC: 0.9 MG/DL (ref 0.5–1.4)
CREAT UR-MCNC: 56.2 MG/DL (ref 23–375)
CV ECHO LV RWT: 0.48 CM
DIFFERENTIAL METHOD BLD: ABNORMAL
DOP CALC AO PEAK VEL: 1.49 M/S
DOP CALC AO VTI: 23.7 CM
DOP CALC LVOT AREA: 4.3 CM2
DOP CALC LVOT DIAMETER: 2.34 CM
DOP CALC LVOT PEAK VEL: 1.35 M/S
DOP CALC LVOT STROKE VOLUME: 96.71 CM3
DOP CALC MV VTI: 36.2 CM
DOP CALCLVOT PEAK VEL VTI: 22.5 CM
E WAVE DECELERATION TIME: 194.15 MSEC
E/A RATIO: 1.65
E/E' RATIO: 5.24 M/S
ECHO LV POSTERIOR WALL: 1.2 CM (ref 0.6–1.1)
EOSINOPHIL # BLD AUTO: 0 K/UL (ref 0–0.5)
EOSINOPHIL NFR BLD: 0 % (ref 0–8)
ERYTHROCYTE [DISTWIDTH] IN BLOOD BY AUTOMATED COUNT: 12.1 % (ref 11.5–14.5)
EST. GFR  (NO RACE VARIABLE): >60 ML/MIN/1.73 M^2
FRACTIONAL SHORTENING: 39 % (ref 28–44)
GLUCOSE SERPL-MCNC: 113 MG/DL (ref 70–110)
GLUCOSE UR QL STRIP: NEGATIVE
HCT VFR BLD AUTO: 28.8 % (ref 40–54)
HCT VFR BLD AUTO: 30.9 % (ref 40–54)
HGB BLD-MCNC: 10.2 G/DL (ref 14–18)
HGB BLD-MCNC: 10.3 G/DL (ref 14–18)
HGB UR QL STRIP: NEGATIVE
IMM GRANULOCYTES # BLD AUTO: 0.02 K/UL (ref 0–0.04)
IMM GRANULOCYTES NFR BLD AUTO: 0.3 % (ref 0–0.5)
INR PPP: 1 (ref 0.8–1.2)
INTERVENTRICULAR SEPTUM: 1.34 CM (ref 0.6–1.1)
IRON SERPL-MCNC: 75 UG/DL (ref 45–160)
IVC DIAMETER: 1.18 CM
IVRT: 65.65 MSEC
KETONES UR QL STRIP: NEGATIVE
LA MAJOR: 4.18 CM
LA MINOR: 4.56 CM
LA WIDTH: 4 CM
LEFT ATRIUM SIZE: 3.14 CM
LEFT ATRIUM VOLUME INDEX: 24.3 ML/M2
LEFT ATRIUM VOLUME: 46.57 CM3
LEFT INTERNAL DIMENSION IN SYSTOLE: 3.06 CM (ref 2.1–4)
LEFT VENTRICLE DIASTOLIC VOLUME INDEX: 60.99 ML/M2
LEFT VENTRICLE DIASTOLIC VOLUME: 117.11 ML
LEFT VENTRICLE MASS INDEX: 131 G/M2
LEFT VENTRICLE SYSTOLIC VOLUME INDEX: 19.1 ML/M2
LEFT VENTRICLE SYSTOLIC VOLUME: 36.65 ML
LEFT VENTRICULAR INTERNAL DIMENSION IN DIASTOLE: 4.98 CM (ref 3.5–6)
LEFT VENTRICULAR MASS: 251.66 G
LEUKOCYTE ESTERASE UR QL STRIP: NEGATIVE
LIPASE SERPL-CCNC: 45 U/L (ref 4–60)
LV LATERAL E/E' RATIO: 4.68 M/S
LV SEPTAL E/E' RATIO: 5.93 M/S
LVOT MG: 3.36 MMHG
LVOT MV: 0.85 CM/S
LYMPHOCYTES # BLD AUTO: 1.4 K/UL (ref 1–4.8)
LYMPHOCYTES NFR BLD: 20.1 % (ref 18–48)
MCH RBC QN AUTO: 30.5 PG (ref 27–31)
MCHC RBC AUTO-ENTMCNC: 35.4 G/DL (ref 32–36)
MCV RBC AUTO: 86 FL (ref 82–98)
METHADONE UR QL SCN>300 NG/ML: NEGATIVE
MONOCYTES # BLD AUTO: 0.3 K/UL (ref 0.3–1)
MONOCYTES NFR BLD: 4.6 % (ref 4–15)
MV MEAN GRADIENT: 2 MMHG
MV PEAK A VEL: 0.54 M/S
MV PEAK E VEL: 0.89 M/S
MV PEAK GRADIENT: 4 MMHG
MV STENOSIS PRESSURE HALF TIME: 56.3 MS
MV VALVE AREA BY CONTINUITY EQUATION: 2.67 CM2
MV VALVE AREA P 1/2 METHOD: 3.91 CM2
NEUTROPHILS # BLD AUTO: 5.1 K/UL (ref 1.8–7.7)
NEUTROPHILS NFR BLD: 74.7 % (ref 38–73)
NITRITE UR QL STRIP: NEGATIVE
NRBC BLD-RTO: 0 /100 WBC
OHS CV RV/LV RATIO: 0.65 CM
OPIATES UR QL SCN: NEGATIVE
PCP UR QL SCN>25 NG/ML: NEGATIVE
PH UR STRIP: 8 [PH] (ref 5–8)
PISA TR MAX VEL: 1.43 M/S
PLATELET # BLD AUTO: 172 K/UL (ref 150–450)
PMV BLD AUTO: 12.9 FL (ref 9.2–12.9)
POTASSIUM SERPL-SCNC: 3.7 MMOL/L (ref 3.5–5.1)
PROT SERPL-MCNC: 7.1 G/DL (ref 6–8.4)
PROT UR QL STRIP: NEGATIVE
PROTHROMBIN TIME: 10.9 SEC (ref 9–12.5)
PV PEAK GRADIENT: 8 MMHG
PV PEAK VELOCITY: 1.43 M/S
RA MAJOR: 4.3 CM
RA PRESSURE ESTIMATED: 3 MMHG
RA WIDTH: 4 CM
RBC # BLD AUTO: 3.34 M/UL (ref 4.6–6.2)
RIGHT VENTRICULAR END-DIASTOLIC DIMENSION: 3.25 CM
RV TB RVSP: 4 MMHG
RV TISSUE DOPPLER FREE WALL SYSTOLIC VELOCITY 1 (APICAL 4 CHAMBER VIEW): 18.9 CM/S
SATURATED IRON: 17 % (ref 20–50)
SINUS: 3.35 CM
SODIUM SERPL-SCNC: 134 MMOL/L (ref 136–145)
SP GR UR STRIP: 1.01 (ref 1–1.03)
SPECIMEN OUTDATE: NORMAL
STJ: 2.41 CM
TDI LATERAL: 0.19 M/S
TDI SEPTAL: 0.15 M/S
TDI: 0.17 M/S
TOTAL IRON BINDING CAPACITY: 432 UG/DL (ref 250–450)
TOXICOLOGY INFORMATION: NORMAL
TR MAX PG: 8 MMHG
TRANSFERRIN SERPL-MCNC: 292 MG/DL (ref 200–375)
TRICUSPID ANNULAR PLANE SYSTOLIC EXCURSION: 1.95 CM
TROPONIN I SERPL DL<=0.01 NG/ML-MCNC: 0.02 NG/ML (ref 0–0.03)
TROPONIN I SERPL DL<=0.01 NG/ML-MCNC: 0.03 NG/ML (ref 0–0.03)
TV REST PULMONARY ARTERY PRESSURE: 11 MMHG
URN SPEC COLLECT METH UR: ABNORMAL
UROBILINOGEN UR STRIP-ACNC: NEGATIVE EU/DL
WBC # BLD AUTO: 6.76 K/UL (ref 3.9–12.7)
Z-SCORE OF LEFT VENTRICULAR DIMENSION IN END DIASTOLE: -0.84
Z-SCORE OF LEFT VENTRICULAR DIMENSION IN END SYSTOLE: -0.68

## 2024-04-27 PROCEDURE — 96375 TX/PRO/DX INJ NEW DRUG ADDON: CPT

## 2024-04-27 PROCEDURE — G0378 HOSPITAL OBSERVATION PER HR: HCPCS

## 2024-04-27 PROCEDURE — 85018 HEMOGLOBIN: CPT | Performed by: STUDENT IN AN ORGANIZED HEALTH CARE EDUCATION/TRAINING PROGRAM

## 2024-04-27 PROCEDURE — 80053 COMPREHEN METABOLIC PANEL: CPT | Performed by: EMERGENCY MEDICINE

## 2024-04-27 PROCEDURE — 25000003 PHARM REV CODE 250

## 2024-04-27 PROCEDURE — 83690 ASSAY OF LIPASE: CPT | Performed by: EMERGENCY MEDICINE

## 2024-04-27 PROCEDURE — 81003 URINALYSIS AUTO W/O SCOPE: CPT | Mod: 59 | Performed by: EMERGENCY MEDICINE

## 2024-04-27 PROCEDURE — 93010 ELECTROCARDIOGRAM REPORT: CPT | Mod: ,,, | Performed by: INTERNAL MEDICINE

## 2024-04-27 PROCEDURE — 96361 HYDRATE IV INFUSION ADD-ON: CPT

## 2024-04-27 PROCEDURE — 96376 TX/PRO/DX INJ SAME DRUG ADON: CPT

## 2024-04-27 PROCEDURE — 86850 RBC ANTIBODY SCREEN: CPT | Performed by: EMERGENCY MEDICINE

## 2024-04-27 PROCEDURE — 63600175 PHARM REV CODE 636 W HCPCS: Performed by: EMERGENCY MEDICINE

## 2024-04-27 PROCEDURE — 83540 ASSAY OF IRON: CPT | Performed by: EMERGENCY MEDICINE

## 2024-04-27 PROCEDURE — 99285 EMERGENCY DEPT VISIT HI MDM: CPT | Mod: 25

## 2024-04-27 PROCEDURE — 85025 COMPLETE CBC W/AUTO DIFF WBC: CPT | Performed by: EMERGENCY MEDICINE

## 2024-04-27 PROCEDURE — 93005 ELECTROCARDIOGRAM TRACING: CPT

## 2024-04-27 PROCEDURE — 84484 ASSAY OF TROPONIN QUANT: CPT | Mod: 91

## 2024-04-27 PROCEDURE — 84484 ASSAY OF TROPONIN QUANT: CPT | Performed by: EMERGENCY MEDICINE

## 2024-04-27 PROCEDURE — C9113 INJ PANTOPRAZOLE SODIUM, VIA: HCPCS

## 2024-04-27 PROCEDURE — 85610 PROTHROMBIN TIME: CPT | Performed by: EMERGENCY MEDICINE

## 2024-04-27 PROCEDURE — 96374 THER/PROPH/DIAG INJ IV PUSH: CPT

## 2024-04-27 PROCEDURE — 85014 HEMATOCRIT: CPT | Performed by: STUDENT IN AN ORGANIZED HEALTH CARE EDUCATION/TRAINING PROGRAM

## 2024-04-27 PROCEDURE — 63600175 PHARM REV CODE 636 W HCPCS

## 2024-04-27 PROCEDURE — 80307 DRUG TEST PRSMV CHEM ANLYZR: CPT | Performed by: EMERGENCY MEDICINE

## 2024-04-27 PROCEDURE — 99204 OFFICE O/P NEW MOD 45 MIN: CPT | Mod: ,,, | Performed by: INTERNAL MEDICINE

## 2024-04-27 PROCEDURE — 25500020 PHARM REV CODE 255: Performed by: STUDENT IN AN ORGANIZED HEALTH CARE EDUCATION/TRAINING PROGRAM

## 2024-04-27 PROCEDURE — C9113 INJ PANTOPRAZOLE SODIUM, VIA: HCPCS | Performed by: EMERGENCY MEDICINE

## 2024-04-27 RX ORDER — PANTOPRAZOLE SODIUM 40 MG/10ML
40 INJECTION, POWDER, LYOPHILIZED, FOR SOLUTION INTRAVENOUS 2 TIMES DAILY
Status: DISCONTINUED | OUTPATIENT
Start: 2024-04-27 | End: 2024-04-27

## 2024-04-27 RX ORDER — PANTOPRAZOLE SODIUM 40 MG/10ML
40 INJECTION, POWDER, LYOPHILIZED, FOR SOLUTION INTRAVENOUS 2 TIMES DAILY
Status: DISCONTINUED | OUTPATIENT
Start: 2024-04-27 | End: 2024-04-29 | Stop reason: HOSPADM

## 2024-04-27 RX ORDER — TALC
6 POWDER (GRAM) TOPICAL NIGHTLY PRN
Status: DISCONTINUED | OUTPATIENT
Start: 2024-04-27 | End: 2024-04-29 | Stop reason: HOSPADM

## 2024-04-27 RX ORDER — NALOXONE HCL 0.4 MG/ML
0.02 VIAL (ML) INJECTION
Status: DISCONTINUED | OUTPATIENT
Start: 2024-04-27 | End: 2024-04-29 | Stop reason: HOSPADM

## 2024-04-27 RX ORDER — DOCUSATE SODIUM 100 MG/1
100 CAPSULE, LIQUID FILLED ORAL 2 TIMES DAILY
Status: ON HOLD | COMMUNITY
End: 2024-04-29 | Stop reason: HOSPADM

## 2024-04-27 RX ORDER — PANTOPRAZOLE SODIUM 40 MG/1
1 TABLET, DELAYED RELEASE ORAL DAILY
Status: ON HOLD | COMMUNITY
Start: 2024-04-25 | End: 2024-04-29

## 2024-04-27 RX ORDER — SODIUM CHLORIDE 9 MG/ML
INJECTION, SOLUTION INTRAVENOUS CONTINUOUS
Status: ACTIVE | OUTPATIENT
Start: 2024-04-27 | End: 2024-04-27

## 2024-04-27 RX ORDER — METOCLOPRAMIDE HYDROCHLORIDE 5 MG/ML
5 INJECTION INTRAMUSCULAR; INTRAVENOUS
Status: COMPLETED | OUTPATIENT
Start: 2024-04-27 | End: 2024-04-27

## 2024-04-27 RX ORDER — ONDANSETRON HYDROCHLORIDE 2 MG/ML
4 INJECTION, SOLUTION INTRAVENOUS EVERY 8 HOURS PRN
Status: DISCONTINUED | OUTPATIENT
Start: 2024-04-27 | End: 2024-04-29 | Stop reason: HOSPADM

## 2024-04-27 RX ORDER — SODIUM CHLORIDE 0.9 % (FLUSH) 0.9 %
10 SYRINGE (ML) INJECTION EVERY 12 HOURS PRN
Status: DISCONTINUED | OUTPATIENT
Start: 2024-04-27 | End: 2024-04-29 | Stop reason: HOSPADM

## 2024-04-27 RX ORDER — IBUPROFEN 200 MG
24 TABLET ORAL
Status: DISCONTINUED | OUTPATIENT
Start: 2024-04-27 | End: 2024-04-29 | Stop reason: HOSPADM

## 2024-04-27 RX ORDER — GLUCAGON 1 MG
1 KIT INJECTION
Status: DISCONTINUED | OUTPATIENT
Start: 2024-04-27 | End: 2024-04-29 | Stop reason: HOSPADM

## 2024-04-27 RX ORDER — IBUPROFEN 200 MG
16 TABLET ORAL
Status: DISCONTINUED | OUTPATIENT
Start: 2024-04-27 | End: 2024-04-29 | Stop reason: HOSPADM

## 2024-04-27 RX ORDER — PANTOPRAZOLE SODIUM 40 MG/10ML
80 INJECTION, POWDER, LYOPHILIZED, FOR SOLUTION INTRAVENOUS
Status: COMPLETED | OUTPATIENT
Start: 2024-04-27 | End: 2024-04-27

## 2024-04-27 RX ORDER — DIPHENHYDRAMINE HYDROCHLORIDE 50 MG/ML
25 INJECTION INTRAMUSCULAR; INTRAVENOUS
Status: COMPLETED | OUTPATIENT
Start: 2024-04-27 | End: 2024-04-27

## 2024-04-27 RX ORDER — ACETAMINOPHEN 325 MG/1
650 TABLET ORAL EVERY 6 HOURS PRN
Status: DISCONTINUED | OUTPATIENT
Start: 2024-04-27 | End: 2024-04-29 | Stop reason: HOSPADM

## 2024-04-27 RX ADMIN — DIPHENHYDRAMINE HYDROCHLORIDE 25 MG: 50 INJECTION, SOLUTION INTRAMUSCULAR; INTRAVENOUS at 02:04

## 2024-04-27 RX ADMIN — PANTOPRAZOLE SODIUM 80 MG: 40 INJECTION, POWDER, FOR SOLUTION INTRAVENOUS at 04:04

## 2024-04-27 RX ADMIN — PANTOPRAZOLE SODIUM 40 MG: 40 INJECTION, POWDER, FOR SOLUTION INTRAVENOUS at 08:04

## 2024-04-27 RX ADMIN — SODIUM CHLORIDE, POTASSIUM CHLORIDE, SODIUM LACTATE AND CALCIUM CHLORIDE 1000 ML: 600; 310; 30; 20 INJECTION, SOLUTION INTRAVENOUS at 02:04

## 2024-04-27 RX ADMIN — SODIUM CHLORIDE: 9 INJECTION, SOLUTION INTRAVENOUS at 10:04

## 2024-04-27 RX ADMIN — SODIUM CHLORIDE: 9 INJECTION, SOLUTION INTRAVENOUS at 05:04

## 2024-04-27 RX ADMIN — METOCLOPRAMIDE 5 MG: 5 INJECTION, SOLUTION INTRAMUSCULAR; INTRAVENOUS at 02:04

## 2024-04-27 RX ADMIN — IOHEXOL 100 ML: 350 INJECTION, SOLUTION INTRAVENOUS at 06:04

## 2024-04-27 NOTE — H&P
[facility] Emergency Dept  [facility] Medicine  History & Physical    Patient Name: Emy Baig  MRN: [id number]  Patient Class: OP- Observation  Admission Date: 4/27/2024  Attending Physician: Bret Marin III, MD   Primary Care Provider: Angelique, Primary Doctor         Patient information was obtained from patient, past medical records, and ER records.     Subjective:     Principal Problem:Upper GI bleed    Chief Complaint:   Chief Complaint   Patient presents with    Chest Pain    Nausea    Vomiting     Pt presents to ED c/o left sided chest pain, intermittent, described as sharp and tightness onset 2300 tonight.  Pt also reports lightheadedness, abd pain, nausea and vomiting.  Denies sob, headache or any other symptoms.  Pt reports taking laxatives and Zofran with no relief.  Last BM x 1.5 weeks ago. Pain 7/10.  Denies pmh.  Pt reports being seen at [facility] and [facility] this week for similar symptoms.          HPI: Emy Baig is a 33 y.o. with a past medical history of cannabinoid hyperemesis syndrome presents to the hospital with complaints of abdominal pain, chest tightness, lightheadedness, nausea, and vomiting for the past several days worsening last night.    Patient states that he has a decreased appetite and has been feeling lightheaded and fatigued along with chest pain/tightness which is sharp and intermittent in nature. He states that he had an episode of hematemesis yesterday and noticed dark blood. He states that his other episodes of vomiting, he did not notice any blood. He is unable to tolerate p.o. keep anything down. Patient also endorses abdominal pain and states that his last bowel movement being 1.5 weeks ago. He has tried laxatives and Zofran with minimal relief. He also states that he had an episode of hematemesis. Patient states that he smokes half a pack of cigarette daily and denies using any drugs or alcohol. Of note patient was seen for similar symptoms at [facility] and [facility]  for the past week. Patient denies any other alleviating or exacerbating factors. Patient denies headache, fever or chills, shortness on breath, diarrhea, lower extremity swelling, or any other associated symptoms.    In the ED:  Patient afebrile without leukocytosis, hemodynamically stable at 99% O2 sat on RA, H and H 10.2/28.8 (recent H&H from 04/20/2024 shows 16.3/48.8), sodium 134, anion gap 6, troponin 0.027, chest x-ray shows no acute findings, x-ray abdomen shows nonobstructive bowel gas pattern with no evidence of pneumoperitoneum.  Patient given diphenhydramine 25 mg IV, metoclopramide 5 mg IV, and 1 L LR bolus in the ED. Case discussed with ED provider and patient we will be placed under observation for further medical management.        Past Medical History:   Diagnosis Date    Cannabinoid hyperemesis syndrome        No past surgical history on file.    Review of patient's allergies indicates:  No Known Allergies    Current Facility-Administered Medications   Medication Dose Route Frequency Provider Last Rate Last Admin    0.9%  NaCl infusion   Intravenous Continuous Mahmud, Rashed, PA-C 100 mL/hr at 04/27/24 0504 New Bag at 04/27/24 0504    acetaminophen tablet 650 mg  650 mg Oral Q6H PRN Mahmud, Rashed, PA-C        dextrose 10% bolus 125 mL 125 mL  12.5 g Intravenous PRN Mahmud, Rashed, PA-C        dextrose 10% bolus 250 mL 250 mL  25 g Intravenous PRN Mahmud, Rashed, PA-C        glucagon (human recombinant) injection 1 mg  1 mg Intramuscular PRN Mahmud, Rashed, PA-C        glucose chewable tablet 16 g  16 g Oral PRN Mahmud, Rashed, PA-C        glucose chewable tablet 24 g  24 g Oral PRN Mahmud, Rashed, PA-C        melatonin tablet 6 mg  6 mg Oral Nightly PRN Mahmud, Rashed, PA-C        naloxone 0.4 mg/mL injection 0.02 mg  0.02 mg Intravenous PRN Mahmud, Rashed, PA-C        ondansetron injection 4 mg  4 mg Intravenous Q8H PRN Mahmud, Rashed, PA-C        pantoprazole injection 40 mg  40 mg Intravenous  BID Anthony Rodriguez PA-C        sodium chloride 0.9% flush 10 mL  10 mL Intravenous Q12H PRN Anthony Rodriguez PA-C         Current Outpatient Medications   Medication Sig Dispense Refill    dicyclomine (BENTYL) 20 mg tablet Take 1 tablet (20 mg total) by mouth 3 (three) times daily. As needed for abdominal pain 20 tablet 0    famotidine (PEPCID) 20 MG tablet Take 1 tablet (20 mg total) by mouth 2 (two) times daily. 20 tablet 0    ibuprofen (ADVIL,MOTRIN) 600 MG tablet Take 1 tablet (600 mg total) by mouth every 6 (six) hours as needed for Pain. 20 tablet 0    ibuprofen (ADVIL,MOTRIN) 600 MG tablet Take 1 tablet (600 mg total) by mouth every 6 (six) hours as needed for Pain. 20 tablet 0    LIDOcaine (LIDODERM) 5 % Place 1 patch onto the skin once daily. Remove & Discard patch within 12 hours or as directed by MD Tatiana patch 0    LORazepam (ATIVAN) 1 MG tablet Take 0.5 tablets (0.5 mg total) by mouth every 6 (six) hours as needed for Anxiety. 6 tablet 0    ondansetron (ZOFRAN) 4 MG tablet Take 1 tablet (4 mg total) by mouth every 8 (eight) hours as needed (Nausea and vomiting). 12 tablet 0    ondansetron (ZOFRAN-ODT) 4 MG TbDL Take 1 tablet (4 mg total) by mouth 2 (two) times daily. 6 tablet 0    promethazine (PHENERGAN) 25 MG suppository Place 1 suppository (25 mg total) rectally every 6 (six) hours as needed for Nausea. 10 suppository 0    promethazine (PHENERGAN) 25 MG tablet Take 1 tablet (25 mg total) by mouth every 6 (six) hours as needed for Nausea. 15 tablet 0     Family History    None       Tobacco Use    Smoking status: Every Day     Current packs/day: 0.50     Types: Cigarettes    Smokeless tobacco: Never   Substance and Sexual Activity    Alcohol use: Not Currently    Drug use: Yes     Types: Marijuana     Comment: also reports use of ecstasy 2 weeks ago    Sexual activity: Not Currently     Review of Systems   Constitutional:  Positive for appetite change.   HENT: Negative.     Respiratory:  Positive for  chest tightness.    Cardiovascular:  Positive for chest pain.   Gastrointestinal:  Positive for abdominal pain, constipation, nausea and vomiting.   Genitourinary: Negative.    Musculoskeletal: Negative.    Neurological:  Positive for dizziness and light-headedness.   Psychiatric/Behavioral: Negative.       Objective:     Vital Signs (Most Recent):  Temp: 98.5 °F (36.9 °C) (04/27/24 0436)  Pulse: 70 (04/27/24 0436)  Resp: 19 (04/27/24 0436)  BP: (!) 145/67 (04/27/24 0436)  SpO2: 100 % (04/27/24 0436) Vital Signs (24h Range):  Temp:  [98.5 °F (36.9 °C)-99.1 °F (37.3 °C)] 98.5 °F (36.9 °C)  Pulse:  [70-98] 70  Resp:  [19-21] 19  SpO2:  [97 %-100 %] 100 %  BP: (130-163)/(67-97) 145/67     Weight: 72.6 kg (160 lb)  Body mass index is 22.32 kg/m².     Physical Exam  Constitutional:       General: He is not in acute distress.     Appearance: Normal appearance.   HENT:      Head: Normocephalic and atraumatic.      Mouth/Throat:      Mouth: Mucous membranes are moist.   Eyes:      Extraocular Movements: Extraocular movements intact.   Cardiovascular:      Rate and Rhythm: Normal rate.      Pulses: Normal pulses.   Pulmonary:      Effort: Pulmonary effort is normal.   Abdominal:      General: Abdomen is flat.      Tenderness: There is abdominal tenderness. There is no guarding or rebound.      Comments: Abdominal tenderness mainly appreciated in the epigastric region, no rebound no guarding   Musculoskeletal:      Right lower leg: No edema.      Left lower leg: No edema.   Skin:     General: Skin is warm.   Neurological:      General: No focal deficit present.      Mental Status: He is alert and oriented to person, place, and time. Mental status is at baseline.      GCS: GCS eye subscore is 4. GCS verbal subscore is 5. GCS motor subscore is 6.   Psychiatric:         Mood and Affect: Mood normal.         Behavior: Behavior normal.         Thought Content: Thought content normal.                Significant Labs: All pertinent  labs within the past 24 hours have been reviewed.    Significant Imaging: I have reviewed all pertinent imaging results/findings within the past 24 hours.  Imaging Results              X-Ray Abdomen Flat And Erect (Final result)  Result time 04/27/24 03:36:27      Final result by Nicholas Anderson MD (04/27/24 03:36:27)                   Impression:      Nonobstructed bowel-gas pattern.      Electronically signed by: Nicholas Anderson MD  Date:    04/27/2024  Time:    03:36               Narrative:    EXAMINATION:  XR ABDOMEN FLAT AND ERECT    CLINICAL HISTORY:  Unspecified abdominal pain    TECHNIQUE:  Flat and erect AP views of the abdomen were preformed.    COMPARISON:  None    FINDINGS:  There are no calcifications overlying the kidneys.  Bowel gas pattern is non-obstructive.  No evidence for pneumoperitoneum.  Regional osseous structures are unremarkable.                                       X-Ray Chest 1 View (Final result)  Result time 04/27/24 03:24:56      Final result by Nicholas Anderson MD (04/27/24 03:24:56)                   Impression:      No acute findings in the chest.      Electronically signed by: Nicholas Anderson MD  Date:    04/27/2024  Time:    03:24               Narrative:    EXAMINATION:  XR CHEST 1 VIEW    CLINICAL HISTORY:  Chest pain, unspecified    TECHNIQUE:  Single frontal view of the chest was performed.    COMPARISON:  11/13/2020.    FINDINGS:  No consolidation, pleural effusion or pneumothorax.    Cardiomediastinal silhouette is unremarkable.                                      Assessment/Plan:     * Upper GI bleed  -Patient presents with 1 episode of hematemesis with dark black blood noticed and several other episodes vomiting since yesterday.  Patient seen [facility] over this past week for similar symptoms. Patient complains of epigastric abdominal pain, last bowel movement 1.5 weeks ago. Also endorses nonradiating chest tightness/chest pain with  troponin levels slightly bumped to 0.027 in the setting of normal EKG without ischemic changes.   -Hemoglobin from 04/20/2024 was 16.3 dropped to 10.2 today     Plan:  -Started on GIB pathway.  -Keep patient NPO.  -Follow H/H closely. Type and screen blood and transfuse as needed.  -Received 80mg IV Protonix in the ED, continue 40mg IV BID  -IV fluids 100 cc/hour  -CTA abdomen pelvis ordered  -GI consulted and would appreciate recommendations    Abdominal pain  -see plan above    Elevated troponin  -Patient complaints of chest pain/chest tightness with slightly initial troponin 0.027  -EKG shows NSR with no ischemic changes  -Trend troponin  -Echocardiogram ordered  -Slight bump in troponin likely due to demand however continue to monitor symptoms      VTE Risk Mitigation (From admission, onward)           Ordered     IP VTE LOW RISK PATIENT  Once         04/27/24 0358                     On 04/27/2024, patient should be placed in hospital observation services under my care in collaboration with Bret Marin MD.      AdmissionCare    Guideline: Gastrointestinal Bleeding (Upper) - OBS, Observation    Based on the indications selected for the patient, the bed status of Observation was determined to be MET    The following indications were selected as present at the time of evaluation of the patient:      - Active bleeding suspected    AdmissionCare documentation entered by: Geoff Jalloh    [facility] [employer], 27th edition, Copyright © 2023 [facility] [employer], [facility] All Rights Reserved.  [id number]:[id number]:00    Anthony Rodriguez PA-C  Department of Hospital Medicine  [facility] [place] - Emergency Dept

## 2024-04-27 NOTE — HPI
Emy Baig is a 33 y.o. with a past medical history of cannabinoid hyperemesis syndrome presents to the hospital with complaints of abdominal pain, chest tightness, lightheadedness, nausea, and vomiting for the past several days worsening last night.    Patient states that he has a decreased appetite and has been feeling lightheaded and fatigued along with chest pain/tightness which is sharp and intermittent in nature. He states that he had an episode of hematemesis yesterday and noticed dark blood. He states that his other episodes of vomiting, he did not notice any blood. He is unable to tolerate p.o. keep anything down. Patient also endorses abdominal pain and states that his last bowel movement being 1.5 weeks ago. He has tried laxatives and Zofran with minimal relief. He also states that he had an episode of hematemesis. Patient states that he smokes half a pack of cigarette daily and denies using any drugs or alcohol. Of note patient was seen for similar symptoms at [facility] and [facility] for the past week. Patient denies any other alleviating or exacerbating factors. Patient denies headache, fever or chills, shortness on breath, diarrhea, lower extremity swelling, or any other associated symptoms.    In the ED:  Patient afebrile without leukocytosis, hemodynamically stable at 99% O2 sat on RA, H and H 10.2/28.8 (recent H&H from 04/20/2024 shows 16.3/48.8), sodium 134, anion gap 6, troponin 0.027, chest x-ray shows no acute findings, x-ray abdomen shows nonobstructive bowel gas pattern with no evidence of pneumoperitoneum.  Patient given diphenhydramine 25 mg IV, metoclopramide 5 mg IV, and 1 L LR bolus in the ED. Case discussed with ED provider and patient we will be placed under observation for further medical management.

## 2024-04-27 NOTE — ED PROVIDER NOTES
Encounter Date: 4/27/2024    SCRIBE #1 NOTE: I, Deana Duran, am scribing for, and in the presence of,  Juan Alberto Nix MD. I have scribed the following portions of the note - Other sections scribed: HPI, ROS.       History     Chief Complaint   Patient presents with    Chest Pain    Nausea    Vomiting     Pt presents to ED c/o left sided chest pain, intermittent, described as sharp and tightness onset 2300 tonight.  Pt also reports lightheadedness, abd pain, nausea and vomiting.  Denies sob, headache or any other symptoms.  Pt reports taking laxatives and Zofran with no relief.  Last BM x 1.5 weeks ago. Pain 7/10.  Denies pmh.  Pt reports being seen at [facility] and [facility] this week for similar symptoms.       Emy Baig is a 33 y.o. male with a PMHx of Cannabinoid hyperemesis syndrome that presents to the ED for abdominal pain that has been occurring for a few days. He notes that the pain makes it difficult for him to eat. The patient also notes associated symptoms of light headedness, chest pain and tightness, nausea, vomiting, and constipation that started about 11 pm tonight. He describes the chest pain as sharp and intermediate. Reports last BM 1.5 weeks ago.The patient reports taking Zofran and about half a bottle of liquid laxatives in attempt to alleviate symptoms, but vomited it up. The patient denies diarrhea, SOB, headaches, and other symptoms. Shx of smoking cigarettes. Denies alcohol or drug use.    Other relevant history includes reports being seen at [facility] and [facility] this week for similar symptoms.      The history is provided by the patient.     Review of patient's allergies indicates:  No Known Allergies  Past Medical History:   Diagnosis Date    Cannabinoid hyperemesis syndrome      No past surgical history on file.  No family history on file.  Social History     Tobacco Use    Smoking status: Every Day     Current packs/day: 0.50     Types: Cigarettes    Smokeless tobacco: Never    Substance Use Topics    Alcohol use: Not Currently    Drug use: Yes     Types: Marijuana     Comment: also reports use of ecstasy 2 weeks ago     Review of Systems   Constitutional: Negative.    HENT: Negative.     Eyes: Negative.    Respiratory:  Positive for chest tightness. Negative for shortness of breath.    Cardiovascular:  Positive for chest pain.   Gastrointestinal:  Positive for abdominal pain, constipation, nausea and vomiting. Negative for diarrhea.   Genitourinary: Negative.    Musculoskeletal: Negative.    Skin: Negative.    Neurological:  Positive for light-headedness. Negative for headaches.       Physical Exam     Initial Vitals [04/27/24 0102]   BP Pulse Resp Temp SpO2   130/73 98 20 99.1 °F (37.3 °C) 99 %      MAP       --         Physical Exam    Nursing note and vitals reviewed.  Constitutional: He is not diaphoretic. He appears distressed (moderately).   HENT:   Head: Normocephalic and atraumatic.   Nose: Nose normal.   Mouth/Throat: No oropharyngeal exudate.   Eyes: EOM are normal. Pupils are equal, round, and reactive to light.   Neck: Neck supple. No tracheal deviation present. No JVD present.   Normal range of motion.  Cardiovascular:  Regular rhythm, normal heart sounds and intact distal pulses.           Tachycardic   Pulmonary/Chest: He is in respiratory distress (mildly tachypneic). He has no wheezes. He has no rhonchi. He has no rales.   Abdominal: Abdomen is soft. Bowel sounds are normal. He exhibits no distension. There is abdominal tenderness (mild tenderness epigastrically). There is no rebound and no guarding.   Musculoskeletal:         General: No tenderness or edema. Normal range of motion.      Cervical back: Normal range of motion and neck supple.     Neurological: He is alert and oriented to person, place, and time. He has normal strength.   Skin: Skin is warm and dry. Capillary refill takes less than 2 seconds. No rash noted. No erythema.         ED Course    Procedures  Labs Reviewed   CBC W/ AUTO DIFFERENTIAL - Abnormal; Notable for the following components:       Result Value    RBC 3.34 (*)     Hemoglobin 10.2 (*)     Hematocrit 28.8 (*)     Gran % 74.7 (*)     All other components within normal limits   COMPREHENSIVE METABOLIC PANEL - Abnormal; Notable for the following components:    Sodium 134 (*)     Glucose 113 (*)     Anion Gap 6 (*)     All other components within normal limits   TROPONIN I - Abnormal; Notable for the following components:    Troponin I 0.027 (*)     All other components within normal limits   LIPASE   DRUG SCREEN PANEL, URINE EMERGENCY   URINALYSIS, REFLEX TO URINE CULTURE   PROTIME-INR   TYPE & SCREEN          Imaging Results              X-Ray Abdomen Flat And Erect (Final result)  Result time 04/27/24 03:36:27      Final result by Nicholas Anderson MD (04/27/24 03:36:27)                   Impression:      Nonobstructed bowel-gas pattern.      Electronically signed by: Nicholas Anderson MD  Date:    04/27/2024  Time:    03:36               Narrative:    EXAMINATION:  XR ABDOMEN FLAT AND ERECT    CLINICAL HISTORY:  Unspecified abdominal pain    TECHNIQUE:  Flat and erect AP views of the abdomen were preformed.    COMPARISON:  None    FINDINGS:  There are no calcifications overlying the kidneys.  Bowel gas pattern is non-obstructive.  No evidence for pneumoperitoneum.  Regional osseous structures are unremarkable.                                       X-Ray Chest 1 View (Final result)  Result time 04/27/24 03:24:56      Final result by Nicholas Anderson MD (04/27/24 03:24:56)                   Impression:      No acute findings in the chest.      Electronically signed by: Nicholas Anderson MD  Date:    04/27/2024  Time:    03:24               Narrative:    EXAMINATION:  XR CHEST 1 VIEW    CLINICAL HISTORY:  Chest pain, unspecified    TECHNIQUE:  Single frontal view of the chest was performed.    COMPARISON:  11/13/2020.    FINDINGS:  No  consolidation, pleural effusion or pneumothorax.    Cardiomediastinal silhouette is unremarkable.                                       Medications   pantoprazole injection 80 mg (has no administration in time range)   metoclopramide injection 5 mg (5 mg Intravenous Given 4/27/24 0200)   diphenhydrAMINE injection 25 mg (25 mg Intravenous Given 4/27/24 0203)   lactated ringers bolus 1,000 mL (1,000 mLs Intravenous New Bag 4/27/24 0200)     Medical Decision Making  Amount and/or Complexity of Data Reviewed  Labs: ordered.  Radiology: ordered.    Risk  Prescription drug management.      MDM:    33-year-old male with past medical history as noted above presenting with chest pain, nausea and vomiting.  Differential Diagnosis includes:  Gastroenteritis, peptic ulcer disease, pancreatitis, coagulopathy acute cholecystitis.  Physical exam as noted above.  ED workup notable for CBC with white blood cell count 6.76, hemoglobin 10.2, CMP with BUN 15, creatinine 0.9, troponin 0.027, lipase 45, KUB shows nonobstructive bowel gas pattern, chest x-ray unremarkable.  Patient presentation concerning for upper GI bleed given his epigastric pain, hematemesis week ago and considerable decline in his hemoglobin from 16 baseline to 10 today.  Protonix given, consult Gastroenterology place, will admit to Hospital Medicine team for further evaluation and management. Discussed diagnosis and further treatment with patient and family at bedside. All questions answered, patient transferred to floor improved and stable.      Note was created using voice recognition software. Note may have occasional typographical or grammatical errors, garbled syntax, and other bizarre constructions that may not have been identified and edited despite good josiah initial review prior to signing.             Scribe Attestation:   Scribe #1: I performed the above scribed service and the documentation accurately describes the services I performed. I attest to the  accuracy of the note.                         I, Juan Alberto Nix M.D., personally performed the services described in this documentation. All medical record entries made by the scribe were at my direction and in my presence. I have reviewed the chart and agree that the record reflects my personal performance and is accurate and complete.        Clinical Impression:  Final diagnoses:  [R10.9] Abdominal pain  [R07.9] Chest pain  [K92.2] UGIB (upper gastrointestinal bleed) (Primary)          ED Disposition Condition    Observation                 Juan Alberto Nix MD  04/27/24 0354

## 2024-04-27 NOTE — CARE UPDATE
33 y.o. with a past medical history of cannabinoid hyperemesis syndrome and illicit drug use (last use of Xtasy one month ago) with complaints of abdominal pain, constipation, nausea/vomiting and episode of hematemesis. Also noted to have a black tarry bowel movement prior to coming to the floor. Take shots of liquor occasionally (1pint lasts a month). Pt started on GI bleed pathway. On IV PPI BID. GI consulted    Continue pantoprazole 40 mg IV BID. Appreciate GI input-NPO after midnight Sunday for EGD Monday. Monitor Hgb     I reviewed the patient's medications, past medical/surgical history and the H&P note. I agree with the physical exam and assessment and plan.

## 2024-04-27 NOTE — CONSULTS
[facility]  Gastroenterology  Consultation Note    Patient Name: Emy Baig  MRN: [id number]  Admission Date: 4/27/2024  Hospital Length of Stay: 0 days  Code Status: Full Code   Attending Provider: Nirav Valdez DO   Consulting Provider: Candy Lees MD  Primary Care Physician: No, Primary Doctor  Principal Problem:Upper GI bleed    Consults  Subjective:     HPI:   Emy Baig is a 33 y.o. male with h/o cannabinoid hyperemesis syndrome (last MJ use about 1-2 mos ago).   Previous to this patient was in ER 4/20/24 with few day h/o generalized abd pain with nausea/vomiting and episode of hematemesis and CT A/P normal with Hgb 16.3. He was sent home with zofran.  He now returns to last night with acute onset of intermittent sharp/tight chest pain and no bowel movements since 10 days prior and tried laxatives but no improvement. He reports about 10-14 days ago having nausea/vomiting and vomiting dark red emesis (about a bowl) with no wretching prior to episode. He had 2 episodes with resolution and resolution and no recurrence but reports weakness, lightheadedness.  He had a black tarry BM this morning. NSAID use- ibuprofen about once every 1-2 mos and no other NSAIDs, no use of PPI, no new meds and never had EGD or colonoscopy in the past or been tested for H pylori.   No family history of GI cancers. Last used MJ 1-2 weeks ago, drinks 1/2-1 pint of alcohol weekly, never had liver problems that he is aware of.                                               ER/Hospital Course:  - no further episodes of hematemesis since arrival  - Hgb 10.2  - troponin 0.027  - Cxray normal, Abd xray normal  - pantoprazole 40 mg IV BID    Past Medical History:   Diagnosis Date    Cannabinoid hyperemesis syndrome      No past surgical history on file.    No family history on file.    Social History     Socioeconomic History    Marital status:    Tobacco Use    Smoking status: Every Day     Current  packs/day: 0.50     Types: Cigarettes    Smokeless tobacco: Never   Substance and Sexual Activity    Alcohol use: Not Currently    Drug use: Yes     Types: Marijuana     Comment: also reports use of ecstasy 2 weeks ago    Sexual activity: Not Currently     No current facility-administered medications on file prior to encounter.     Current Outpatient Medications on File Prior to Encounter   Medication Sig Dispense Refill    dicyclomine (BENTYL) 20 mg tablet Take 1 tablet (20 mg total) by mouth 3 (three) times daily. As needed for abdominal pain 20 tablet 0    famotidine (PEPCID) 20 MG tablet Take 1 tablet (20 mg total) by mouth 2 (two) times daily. 20 tablet 0    ibuprofen (ADVIL,MOTRIN) 600 MG tablet Take 1 tablet (600 mg total) by mouth every 6 (six) hours as needed for Pain. 20 tablet 0    ibuprofen (ADVIL,MOTRIN) 600 MG tablet Take 1 tablet (600 mg total) by mouth every 6 (six) hours as needed for Pain. 20 tablet 0    LIDOcaine (LIDODERM) 5 % Place 1 patch onto the skin once daily. Remove & Discard patch within 12 hours or as directed by MD 8 patch 0    LORazepam (ATIVAN) 1 MG tablet Take 0.5 tablets (0.5 mg total) by mouth every 6 (six) hours as needed for Anxiety. 6 tablet 0    ondansetron (ZOFRAN) 4 MG tablet Take 1 tablet (4 mg total) by mouth every 8 (eight) hours as needed (Nausea and vomiting). 12 tablet 0    ondansetron (ZOFRAN-ODT) 4 MG TbDL Take 1 tablet (4 mg total) by mouth 2 (two) times daily. 6 tablet 0    promethazine (PHENERGAN) 25 MG suppository Place 1 suppository (25 mg total) rectally every 6 (six) hours as needed for Nausea. 10 suppository 0    promethazine (PHENERGAN) 25 MG tablet Take 1 tablet (25 mg total) by mouth every 6 (six) hours as needed for Nausea. 15 tablet 0     Review of patient's allergies indicates:  No Known Allergies    Review of Systems   Constitutional:  Negative for chills, fever and weight loss.   Respiratory:  Negative for cough, hemoptysis and shortness of breath.     Cardiovascular:  Positive for chest pain. Negative for leg swelling.   Gastrointestinal:  Positive for constipation, melena, nausea and vomiting. Negative for heartburn.   Genitourinary:  Negative for dysuria.   Musculoskeletal:  Negative for joint pain.   Skin:  Negative for rash.   Neurological:  Positive for dizziness and weakness.        Objective:     Vitals:    04/27/24 0730   BP: 133/80   Pulse: 65   Resp: 19   Temp:      Physical Exam  Cardiovascular:      Rate and Rhythm: Normal rate and regular rhythm.   Pulmonary:      Effort: Pulmonary effort is normal. No respiratory distress.   Abdominal:      General: Bowel sounds are normal. There is no distension.      Palpations: Abdomen is soft.      Tenderness: There is no abdominal tenderness.   Musculoskeletal:      Right lower leg: No edema.      Left lower leg: No edema.          Significant Labs:  Recent Labs   Lab 04/20/24 2015 04/27/24  0159   HGB 16.3 10.2*     Lab Results   Component Value Date    WBC 6.76 04/27/2024    HGB 10.2 (L) 04/27/2024    HCT 28.8 (L) 04/27/2024    MCV 86 04/27/2024     04/27/2024       Lab Results   Component Value Date     (L) 04/27/2024    K 3.7 04/27/2024     04/27/2024    CO2 28 04/27/2024    BUN 15 04/27/2024    CREATININE 0.9 04/27/2024    CALCIUM 9.2 04/27/2024    ANIONGAP 6 (L) 04/27/2024    ESTGFRAFRICA >60 06/15/2021    EGFRNONAA >60 06/15/2021       Lab Results   Component Value Date    ALT 27 04/27/2024    AST 29 04/27/2024    ALKPHOS 69 04/27/2024    BILITOT 0.4 04/27/2024     Lab Results   Component Value Date    INR 1.0 04/27/2024     Significant Imaging:  Reviewed pertinent radiology findings.     Assessment/Plan:     Emy Baig is a 33 y.o. male with h/o cannabinoid hyperemesis syndrome (last MJ use about 1-2 mos ago), alcohol use who is had episode of dark red hematemesis about 10 days ago with a significant decrease in Hgb in past week (16--->10).  No further episodes and had no BM  for 10 days which is not his baseline and had melanic stool this morning.  Rare NSAID use though home meds with ibuprofen.  Discussed possible cause including esophagitis, gastritis, ulcers.  MW tear less likely based on history.     Problem List:  Hematemesis    Plan:  IV lines adequate  Regular diet okay if tolerated today but pt does not wish to eat  CBC- monitor- daily okay if no further episodes of hematemesis  Avoid NSAIDs  Continue pantoprazole 40 mg IV BID  NPO after midnight Sunday for EGD Monday    Thank you for involving us in the care of Emy Baig. Please call with any additional questions, concerns or changes in the patient's clinical status.    Candy Lees MD  [facility]  Department of Gastroenterology

## 2024-04-27 NOTE — PHARMACY MED REC
"Admission Medication History     The home medication history was taken by Zulma Gallagher.    You may go to "Admission" then "Reconcile Home Medications" tabs to review and/or act upon these items.     The home medication list has been updated by the Pharmacy department.   Please read ALL comments highlighted in yellow.   Please address this information as you see fit.    Feel free to contact us if you have any questions or require assistance.      The medications listed below were removed from the home medication list. Please reorder if appropriate:  Patient reports no longer taking the following medication(s):  Dicyclomine  Lidocaine 5&  Promethazine    Medications listed below were obtained from: Patient/family and Analytic software- [employer] and added to home medication:     Laxative      Potential issues to be addressed PRIOR TO DISCHARGE  Prescriptions could not be filled prior to admission: (Pantoprazole 40 mg)      The medication reconciliation was completed by the patient's bedside.      Zulma Gallagher  [phone]              .          "

## 2024-04-27 NOTE — NURSING
[facility], [facility] [place]  Nurses Note -- 4 Eyes      4/27/2024       Skin assessed on: Admit      [x] No Pressure Injuries Present    []Prevention Measures Documented    [] Yes LDA  for Pressure Injury Previously documented     [] Yes New Pressure Injury Discovered   [] LDA for New Pressure Injury Added      Attending RN:  Sushila Orellana, RN     Second: LAURIE Delarosa

## 2024-04-27 NOTE — ADMISSIONCARE
AdmissionCare    Guideline: Gastrointestinal Bleeding (Upper) - OBS, Observation    Based on the indications selected for the patient, the bed status of Observation was determined to be MET    The following indications were selected as present at the time of evaluation of the patient:      - Active bleeding suspected    AdmissionCare documentation entered by: Geoff Jalloh    [facility], 27th edition, Copyright © 2023 [facility] [employer], [facility] All Rights Reserved.  [id number]:00

## 2024-04-27 NOTE — PLAN OF CARE
Case Management Assessment     PCP: Does not have a PCP, would like a referral for [facility] at discharge.  Pharmacy: [facility] on [address]    Patient Arrived From: Home  Existing Help at Home: Spouse Maggie Gallagher [phone]     Barriers to Discharge: None    Discharge Plan:    A. Home   B. Home with family          04/27/24 1526   Discharge Assessment   Assessment Type Discharge Planning Assessment   Confirmed/corrected address, phone number and insurance Yes   Confirmed Demographics Correct on Facesheet   Source of Information patient   Communicated KENIA with patient/caregiver Date not available/Unable to determine   People in Home spouse   Do you expect to return to your current living situation? Yes   Do you have help at home or someone to help you manage your care at home? Yes   Who are your caregiver(s) and their phone number(s)? Independent, Spouse Maggie Gallagher [phone] will help if needed.   Current cognitive status: Alert/Oriented   Equipment Currently Used at Home none   Readmission within 30 days? No   Patient currently being followed by outpatient case management? No   Do you take prescription medications? No   Who is going to help you get home at discharge? Spouse   How do you get to doctors appointments? car, drives self;family or friend will provide   Are you on dialysis? No   Do you take coumadin? No   Discharge Plan A Home   Discharge Plan B Home with family   DME Needed Upon Discharge  none   Discharge Plan discussed with: Spouse/sig other;Patient   Transition of Care Barriers None

## 2024-04-27 NOTE — ASSESSMENT & PLAN NOTE
-Patient complaints of chest pain/chest tightness with slightly initial troponin 0.027  -EKG shows NSR with no ischemic changes  -Trend troponin  -Echocardiogram ordered  -Slight bump in troponin likely due to demand however continue to monitor symptoms

## 2024-04-27 NOTE — SUBJECTIVE & OBJECTIVE
Past Medical History:   Diagnosis Date    Cannabinoid hyperemesis syndrome        No past surgical history on file.    Review of patient's allergies indicates:  No Known Allergies    Current Facility-Administered Medications   Medication Dose Route Frequency Provider Last Rate Last Admin    0.9%  NaCl infusion   Intravenous Continuous Mahmud, Rashed, PA-C 100 mL/hr at 04/27/24 0504 New Bag at 04/27/24 0504    acetaminophen tablet 650 mg  650 mg Oral Q6H PRN Mahmud, Rashed, PA-C        dextrose 10% bolus 125 mL 125 mL  12.5 g Intravenous PRN Mahmud, Rashed, PA-C        dextrose 10% bolus 250 mL 250 mL  25 g Intravenous PRN Mahmud, Rashed, PA-C        glucagon (human recombinant) injection 1 mg  1 mg Intramuscular PRN Mahmud, Rashed, PA-C        glucose chewable tablet 16 g  16 g Oral PRN Mahmud, Rashed, PA-C        glucose chewable tablet 24 g  24 g Oral PRN Mahmud, Rashed, PA-C        melatonin tablet 6 mg  6 mg Oral Nightly PRN Mahmud, Rashed, PA-C        naloxone 0.4 mg/mL injection 0.02 mg  0.02 mg Intravenous PRN Mahmud, Rashed, PA-C        ondansetron injection 4 mg  4 mg Intravenous Q8H PRN Mahmud, Rashed, PA-C        pantoprazole injection 40 mg  40 mg Intravenous BID Mahmud, Rashed, PA-C        sodium chloride 0.9% flush 10 mL  10 mL Intravenous Q12H PRN Mahmud, Rashed, PA-C         Current Outpatient Medications   Medication Sig Dispense Refill    dicyclomine (BENTYL) 20 mg tablet Take 1 tablet (20 mg total) by mouth 3 (three) times daily. As needed for abdominal pain 20 tablet 0    famotidine (PEPCID) 20 MG tablet Take 1 tablet (20 mg total) by mouth 2 (two) times daily. 20 tablet 0    ibuprofen (ADVIL,MOTRIN) 600 MG tablet Take 1 tablet (600 mg total) by mouth every 6 (six) hours as needed for Pain. 20 tablet 0    ibuprofen (ADVIL,MOTRIN) 600 MG tablet Take 1 tablet (600 mg total) by mouth every 6 (six) hours as needed for Pain. 20 tablet 0    LIDOcaine (LIDODERM) 5 % Place 1 patch onto the skin once  daily. Remove & Discard patch within 12 hours or as directed by MD 8 patch 0    LORazepam (ATIVAN) 1 MG tablet Take 0.5 tablets (0.5 mg total) by mouth every 6 (six) hours as needed for Anxiety. 6 tablet 0    ondansetron (ZOFRAN) 4 MG tablet Take 1 tablet (4 mg total) by mouth every 8 (eight) hours as needed (Nausea and vomiting). 12 tablet 0    ondansetron (ZOFRAN-ODT) 4 MG TbDL Take 1 tablet (4 mg total) by mouth 2 (two) times daily. 6 tablet 0    promethazine (PHENERGAN) 25 MG suppository Place 1 suppository (25 mg total) rectally every 6 (six) hours as needed for Nausea. 10 suppository 0    promethazine (PHENERGAN) 25 MG tablet Take 1 tablet (25 mg total) by mouth every 6 (six) hours as needed for Nausea. 15 tablet 0     Family History    None       Tobacco Use    Smoking status: Every Day     Current packs/day: 0.50     Types: Cigarettes    Smokeless tobacco: Never   Substance and Sexual Activity    Alcohol use: Not Currently    Drug use: Yes     Types: Marijuana     Comment: also reports use of ecstasy 2 weeks ago    Sexual activity: Not Currently     Review of Systems   Constitutional:  Positive for appetite change.   HENT: Negative.     Respiratory:  Positive for chest tightness.    Cardiovascular:  Positive for chest pain.   Gastrointestinal:  Positive for abdominal pain, constipation, nausea and vomiting.   Genitourinary: Negative.    Musculoskeletal: Negative.    Neurological:  Positive for dizziness and light-headedness.   Psychiatric/Behavioral: Negative.       Objective:     Vital Signs (Most Recent):  Temp: 98.5 °F (36.9 °C) (04/27/24 0436)  Pulse: 70 (04/27/24 0436)  Resp: 19 (04/27/24 0436)  BP: (!) 145/67 (04/27/24 0436)  SpO2: 100 % (04/27/24 0436) Vital Signs (24h Range):  Temp:  [98.5 °F (36.9 °C)-99.1 °F (37.3 °C)] 98.5 °F (36.9 °C)  Pulse:  [70-98] 70  Resp:  [19-21] 19  SpO2:  [97 %-100 %] 100 %  BP: (130-163)/(67-97) 145/67     Weight: 72.6 kg (160 lb)  Body mass index is 22.32 kg/m².      Physical Exam  Constitutional:       General: He is not in acute distress.     Appearance: Normal appearance.   HENT:      Head: Normocephalic and atraumatic.      Mouth/Throat:      Mouth: Mucous membranes are moist.   Eyes:      Extraocular Movements: Extraocular movements intact.   Cardiovascular:      Rate and Rhythm: Normal rate.      Pulses: Normal pulses.   Pulmonary:      Effort: Pulmonary effort is normal.   Abdominal:      General: Abdomen is flat.      Tenderness: There is abdominal tenderness. There is no guarding or rebound.      Comments: Abdominal tenderness mainly appreciated in the epigastric region, no rebound no guarding   Musculoskeletal:      Right lower leg: No edema.      Left lower leg: No edema.   Skin:     General: Skin is warm.   Neurological:      General: No focal deficit present.      Mental Status: He is alert and oriented to person, place, and time. Mental status is at baseline.      GCS: GCS eye subscore is 4. GCS verbal subscore is 5. GCS motor subscore is 6.   Psychiatric:         Mood and Affect: Mood normal.         Behavior: Behavior normal.         Thought Content: Thought content normal.                Significant Labs: All pertinent labs within the past 24 hours have been reviewed.    Significant Imaging: I have reviewed all pertinent imaging results/findings within the past 24 hours.  Imaging Results              X-Ray Abdomen Flat And Erect (Final result)  Result time 04/27/24 03:36:27      Final result by Nicholas Anderson MD (04/27/24 03:36:27)                   Impression:      Nonobstructed bowel-gas pattern.      Electronically signed by: Nicholas Anderson MD  Date:    04/27/2024  Time:    03:36               Narrative:    EXAMINATION:  XR ABDOMEN FLAT AND ERECT    CLINICAL HISTORY:  Unspecified abdominal pain    TECHNIQUE:  Flat and erect AP views of the abdomen were preformed.    COMPARISON:  None    FINDINGS:  There are no calcifications overlying the kidneys.   Bowel gas pattern is non-obstructive.  No evidence for pneumoperitoneum.  Regional osseous structures are unremarkable.                                       X-Ray Chest 1 View (Final result)  Result time 04/27/24 03:24:56      Final result by Nicholas Anderson MD (04/27/24 03:24:56)                   Impression:      No acute findings in the chest.      Electronically signed by: Nicholas Anderson MD  Date:    04/27/2024  Time:    03:24               Narrative:    EXAMINATION:  XR CHEST 1 VIEW    CLINICAL HISTORY:  Chest pain, unspecified    TECHNIQUE:  Single frontal view of the chest was performed.    COMPARISON:  11/13/2020.    FINDINGS:  No consolidation, pleural effusion or pneumothorax.    Cardiomediastinal silhouette is unremarkable.

## 2024-04-27 NOTE — PLAN OF CARE
Problem: Adult Inpatient Plan of Care  Goal: Plan of Care Review  Outcome: Progressing  Flowsheets (Taken 4/27/2024 1045)  Plan of Care Reviewed With: patient  Goal: Patient-Specific Goal (Individualized)  Outcome: Progressing     Problem: Gastrointestinal Bleeding  Goal: Optimal Coping with Acute Illness  Outcome: Progressing  Intervention: Optimize Psychosocial Response  Flowsheets (Taken 4/27/2024 1045)  Supportive Measures:   active listening utilized   self-care encouraged     Problem: Adult Inpatient Plan of Care  Goal: Plan of Care Review  Outcome: Progressing  Flowsheets (Taken 4/27/2024 1045)  Plan of Care Reviewed With: patient     Problem: Adult Inpatient Plan of Care  Goal: Plan of Care Review  Outcome: Progressing  Flowsheets (Taken 4/27/2024 1045)  Plan of Care Reviewed With: patient     Problem: Adult Inpatient Plan of Care  Goal: Patient-Specific Goal (Individualized)  Outcome: Progressing     Problem: Adult Inpatient Plan of Care  Goal: Patient-Specific Goal (Individualized)  Outcome: Progressing     Problem: Gastrointestinal Bleeding  Goal: Optimal Coping with Acute Illness  Outcome: Progressing  Intervention: Optimize Psychosocial Response  Flowsheets (Taken 4/27/2024 1045)  Supportive Measures:   active listening utilized   self-care encouraged     Problem: Gastrointestinal Bleeding  Goal: Optimal Coping with Acute Illness  Outcome: Progressing     Problem: Gastrointestinal Bleeding  Goal: Optimal Coping with Acute Illness  Intervention: Optimize Psychosocial Response  Flowsheets (Taken 4/27/2024 1045)  Supportive Measures:   active listening utilized   self-care encouraged     Problem: Gastrointestinal Bleeding  Goal: Optimal Coping with Acute Illness  Intervention: Optimize Psychosocial Response  Flowsheets (Taken 4/27/2024 1045)  Supportive Measures:   active listening utilized   self-care encouraged

## 2024-04-27 NOTE — ASSESSMENT & PLAN NOTE
-Patient presents with 1 episode of hematemesis with dark black blood noticed and several other episodes vomiting since yesterday.  [place] [facility] [place] [facility] over this past week for similar symptoms. Patient complains of epigastric abdominal pain, last bowel movement 1.5 weeks ago. Also endorses nonradiating chest tightness/chest pain with troponin levels slightly bumped to 0.027 in the setting of normal EKG without ischemic changes.   -Hemoglobin from 04/20/2024 was 16.3 dropped to 10.2 today     Plan:  -Started on GIB pathway.  -Keep patient NPO.  -Follow H/H closely. Type and screen blood and transfuse as needed.  -Received 80mg IV Protonix in the ED, continue 40mg IV BID  -IV fluids 100 cc/hour  -CTA abdomen pelvis ordered  -GI consulted and would appreciate recommendations

## 2024-04-28 LAB
BASOPHILS # BLD AUTO: 0.02 K/UL (ref 0–0.2)
BASOPHILS NFR BLD: 0.3 % (ref 0–1.9)
DIFFERENTIAL METHOD BLD: ABNORMAL
EOSINOPHIL # BLD AUTO: 0 K/UL (ref 0–0.5)
EOSINOPHIL NFR BLD: 0.6 % (ref 0–8)
ERYTHROCYTE [DISTWIDTH] IN BLOOD BY AUTOMATED COUNT: 12.5 % (ref 11.5–14.5)
HCT VFR BLD AUTO: 31.6 % (ref 40–54)
HGB BLD-MCNC: 10.2 G/DL (ref 14–18)
IMM GRANULOCYTES # BLD AUTO: 0.02 K/UL (ref 0–0.04)
IMM GRANULOCYTES NFR BLD AUTO: 0.3 % (ref 0–0.5)
LYMPHOCYTES # BLD AUTO: 2 K/UL (ref 1–4.8)
LYMPHOCYTES NFR BLD: 28.6 % (ref 18–48)
MCH RBC QN AUTO: 30.4 PG (ref 27–31)
MCHC RBC AUTO-ENTMCNC: 32.3 G/DL (ref 32–36)
MCV RBC AUTO: 94 FL (ref 82–98)
MONOCYTES # BLD AUTO: 0.5 K/UL (ref 0.3–1)
MONOCYTES NFR BLD: 7.4 % (ref 4–15)
NEUTROPHILS # BLD AUTO: 4.5 K/UL (ref 1.8–7.7)
NEUTROPHILS NFR BLD: 62.8 % (ref 38–73)
NRBC BLD-RTO: 0 /100 WBC
PLATELET # BLD AUTO: 196 K/UL (ref 150–450)
PMV BLD AUTO: 13 FL (ref 9.2–12.9)
RBC # BLD AUTO: 3.35 M/UL (ref 4.6–6.2)
WBC # BLD AUTO: 7.07 K/UL (ref 3.9–12.7)

## 2024-04-28 PROCEDURE — C9113 INJ PANTOPRAZOLE SODIUM, VIA: HCPCS

## 2024-04-28 PROCEDURE — 25000003 PHARM REV CODE 250: Performed by: STUDENT IN AN ORGANIZED HEALTH CARE EDUCATION/TRAINING PROGRAM

## 2024-04-28 PROCEDURE — 96376 TX/PRO/DX INJ SAME DRUG ADON: CPT

## 2024-04-28 PROCEDURE — 85025 COMPLETE CBC W/AUTO DIFF WBC: CPT

## 2024-04-28 PROCEDURE — 36415 COLL VENOUS BLD VENIPUNCTURE: CPT

## 2024-04-28 PROCEDURE — S4991 NICOTINE PATCH NONLEGEND: HCPCS | Performed by: STUDENT IN AN ORGANIZED HEALTH CARE EDUCATION/TRAINING PROGRAM

## 2024-04-28 PROCEDURE — 25000003 PHARM REV CODE 250

## 2024-04-28 PROCEDURE — G0378 HOSPITAL OBSERVATION PER HR: HCPCS

## 2024-04-28 PROCEDURE — 63600175 PHARM REV CODE 636 W HCPCS

## 2024-04-28 RX ORDER — IBUPROFEN 200 MG
1 TABLET ORAL DAILY
Status: DISCONTINUED | OUTPATIENT
Start: 2024-04-28 | End: 2024-04-29 | Stop reason: HOSPADM

## 2024-04-28 RX ADMIN — Medication 1 PATCH: at 09:04

## 2024-04-28 RX ADMIN — PANTOPRAZOLE SODIUM 40 MG: 40 INJECTION, POWDER, FOR SOLUTION INTRAVENOUS at 09:04

## 2024-04-28 RX ADMIN — ACETAMINOPHEN 650 MG: 325 TABLET ORAL at 09:04

## 2024-04-28 NOTE — NURSING
[facility], [facility]  Nurses Note -- 4 Eyes      4/28/2024       Skin assessed on: Q Shift      [x] No Pressure Injuries Present    []Prevention Measures Documented    [] Yes LDA  for Pressure Injury Previously documented     [] Yes New Pressure Injury Discovered   [] LDA for New Pressure Injury Added      Attending RN:  Sushila Orellana RN     Second RN:  Gómez

## 2024-04-28 NOTE — PLAN OF CARE
Problem: Adult Inpatient Plan of Care  Goal: Plan of Care Review  Outcome: Progressing  Flowsheets (Taken 4/28/2024 0815)  Plan of Care Reviewed With: patient  Goal: Patient-Specific Goal (Individualized)  Outcome: Progressing     Problem: Gastrointestinal Bleeding  Goal: Optimal Coping with Acute Illness  Outcome: Progressing  Intervention: Optimize Psychosocial Response  Flowsheets (Taken 4/28/2024 0815)  Supportive Measures:   active listening utilized   self-care encouraged     Problem: Adult Inpatient Plan of Care  Goal: Plan of Care Review  Outcome: Progressing  Flowsheets (Taken 4/28/2024 0815)  Plan of Care Reviewed With: patient     Problem: Adult Inpatient Plan of Care  Goal: Plan of Care Review  Outcome: Progressing  Flowsheets (Taken 4/28/2024 0815)  Plan of Care Reviewed With: patient     Problem: Adult Inpatient Plan of Care  Goal: Patient-Specific Goal (Individualized)  Outcome: Progressing     Problem: Adult Inpatient Plan of Care  Goal: Patient-Specific Goal (Individualized)  Outcome: Progressing     Problem: Gastrointestinal Bleeding  Goal: Optimal Coping with Acute Illness  Outcome: Progressing  Intervention: Optimize Psychosocial Response  Flowsheets (Taken 4/28/2024 0815)  Supportive Measures:   active listening utilized   self-care encouraged     Problem: Gastrointestinal Bleeding  Goal: Optimal Coping with Acute Illness  Outcome: Progressing     Problem: Gastrointestinal Bleeding  Goal: Optimal Coping with Acute Illness  Intervention: Optimize Psychosocial Response  Flowsheets (Taken 4/28/2024 0815)  Supportive Measures:   active listening utilized   self-care encouraged     Problem: Gastrointestinal Bleeding  Goal: Optimal Coping with Acute Illness  Intervention: Optimize Psychosocial Response  Flowsheets (Taken 4/28/2024 0815)  Supportive Measures:   active listening utilized   self-care encouraged

## 2024-04-28 NOTE — NURSING
[facility], [facility]  Nurses Note -- 4 Eyes      4/27/2024       Skin assessed on: Q Shift      [x] No Pressure Injuries Present    []Prevention Measures Documented    [] Yes LDA  for Pressure Injury Previously documented     [] Yes New Pressure Injury Discovered   [] LDA for New Pressure Injury Added      Attending RN:  Gómez Chapman RN     Second RN:  JEB Conti

## 2024-04-28 NOTE — HOSPITAL COURSE
33 y.o. with a past medical history of cannabinoid hyperemesis syndrome and illicit drug use (last use of Xtasy one month ago) with complaints of abdominal pain, constipation, nausea/vomiting and episode of hematemesis. Also noted to have a black tarry bowel movement prior to coming to the floor. Take shots of liquor occasionally (1pint lasts a month). Pt started on GI bleed pathway. On IV PPI BID. GI consulted. Continue pantoprazole 40 mg IV BID. Appreciate GI input. Monitor Hgb. Clear liquids today, EGD in am 4/29.    Hb stable, suspect mauricio-payne tear that has now healed/stopped bleeding.

## 2024-04-28 NOTE — PLAN OF CARE
Problem: Adult Inpatient Plan of Care  Goal: Absence of Hospital-Acquired Illness or Injury  Outcome: Progressing  Intervention: Identify and Manage Fall Risk  Flowsheets (Taken 4/28/2024 0703)  Safety Promotion/Fall Prevention:   assistive device/personal item within reach   nonskid shoes/socks when out of bed   lighting adjusted   medications reviewed  Intervention: Prevent Skin Injury  Flowsheets (Taken 4/28/2024 0703)  Body Position: position changed independently     Problem: Fall Injury Risk  Goal: Absence of Fall and Fall-Related Injury  Outcome: Progressing  Intervention: Identify and Manage Contributors  Flowsheets (Taken 4/28/2024 0703)  Medication Review/Management: medications reviewed

## 2024-04-28 NOTE — PROGRESS NOTES
[facility] Medicine  Progress Note    Patient Name: Emy Baig  MRN: [id number]  Patient Class: OP- Observation   Admission Date: 4/27/2024  Length of Stay: 0 days  Attending Physician: Ramsey Gregorio MD  Primary Care Provider: Angelique, Primary Doctor        Subjective:     Principal Problem:Upper GI bleed        HPI:  Emy Baig is a 33 y.o. with a past medical history of cannabinoid hyperemesis syndrome presents to the hospital with complaints of abdominal pain, chest tightness, lightheadedness, nausea, and vomiting for the past several days worsening last night.    Patient states that he has a decreased appetite and has been feeling lightheaded and fatigued along with chest pain/tightness which is sharp and intermittent in nature. He states that he had an episode of hematemesis yesterday and noticed dark blood. He states that his other episodes of vomiting, he did not notice any blood. He is unable to tolerate p.o. keep anything down. Patient also endorses abdominal pain and states that his last bowel movement being 1.5 weeks ago. He has tried laxatives and Zofran with minimal relief. He also states that he had an episode of hematemesis. Patient states that he smokes half a pack of cigarette daily and denies using any drugs or alcohol. Of note patient was seen for similar symptoms at [facility] and [facility] for the past week. Patient denies any other alleviating or exacerbating factors. Patient denies headache, fever or chills, shortness on breath, diarrhea, lower extremity swelling, or any other associated symptoms.    In the ED:  Patient afebrile without leukocytosis, hemodynamically stable at 99% O2 sat on RA, H and H 10.2/28.8 (recent H&H from 04/20/2024 shows 16.3/48.8), sodium 134, anion gap 6, troponin 0.027, chest x-ray shows no acute findings, x-ray abdomen shows nonobstructive bowel gas pattern with no evidence of pneumoperitoneum.  Patient given diphenhydramine 25 mg IV,  metoclopramide 5 mg IV, and 1 L LR bolus in the ED. Case discussed with ED provider and patient we will be placed under observation for further medical management.        Overview/Hospital Course:  33 y.o. with a past medical history of cannabinoid hyperemesis syndrome and illicit drug use (last use of Xtasy one month ago) with complaints of abdominal pain, constipation, nausea/vomiting and episode of hematemesis. Also noted to have a black tarry bowel movement prior to coming to the floor. Take shots of liquor occasionally (1pint lasts a month). Pt started on GI bleed pathway. On IV PPI BID. GI consulted. Continue pantoprazole 40 mg IV BID. Appreciate GI input. Monitor Hgb. Clear liquids today, EGD in am 4/29.    Interval History:  NAEON.  No new issues.   CC nausea  All questions answered and updates on care given.       ROS:  General: Negative for fevers or chills.  Cardiac: Negative for chest pain or orthopnea   Pulmonary: Negative for dyspnea or wheezing.  GI: Negative for abdominal distention or pain     Vitals:    04/28/24 0306 04/28/24 0436 04/28/24 0721 04/28/24 0723   BP:  117/66 120/62    BP Location:  Left arm Right arm    Patient Position:  Lying Lying    Pulse: 78 70 70 80   Resp:  18 16    Temp:  98.6 °F (37 °C) 98.5 °F (36.9 °C)    TempSrc:  Oral Oral    SpO2:  100% 100%    Weight:       Height:              Body mass index is 21.62 kg/m².      PHYSICAL EXAM:  GENERAL APPEARANCE: alert and cooperative, and appears to be in no acute distress.  HEENT:     HEAD: NC/AT     EYES: PERRL, EOMI.  Vision is grossly intact.  NECK: Neck supple, non-tender without LAD, masses or thyromegaly.  CARDIAC: There is no peripheral edema, cyanosis or pallor.   LUNGS: Clear to auscultation and percussion without rales or wheezing  ABDOMEN: Non-distended. No guarding.  MSK: No joint erythema or tenderness.   EXTREMITIES: No significant deformity or joint abnormality. No edema.   NEUROLOGICAL: CN II-XII grossly intact.    SKIN: Skin normal color, texture and turgor with no lesions or eruptions.  PSYCHIATRIC: Oriented. No tangential speech. No Hyperactive features.        Recent Results (from the past 24 hour(s))   CBC auto differential    Collection Time: 04/28/24  4:36 AM   Result Value Ref Range    WBC 7.07 3.90 - 12.70 K/uL    RBC 3.35 (L) 4.60 - 6.20 M/uL    Hemoglobin 10.2 (L) 14.0 - 18.0 g/dL    Hematocrit 31.6 (L) 40.0 - 54.0 %    MCV 94 82 - 98 fL    MCH 30.4 27.0 - 31.0 pg    MCHC 32.3 32.0 - 36.0 g/dL    RDW 12.5 11.5 - 14.5 %    Platelets 196 150 - 450 K/uL    MPV 13.0 (H) 9.2 - 12.9 fL    Immature Granulocytes 0.3 0.0 - 0.5 %    Gran # (ANC) 4.5 1.8 - 7.7 K/uL    Immature Grans (Abs) 0.02 0.00 - 0.04 K/uL    Lymph # 2.0 1.0 - 4.8 K/uL    Mono # 0.5 0.3 - 1.0 K/uL    Eos # 0.0 0.0 - 0.5 K/uL    Baso # 0.02 0.00 - 0.20 K/uL    nRBC 0 0 /100 WBC    Gran % 62.8 38.0 - 73.0 %    Lymph % 28.6 18.0 - 48.0 %    Mono % 7.4 4.0 - 15.0 %    Eosinophil % 0.6 0.0 - 8.0 %    Basophil % 0.3 0.0 - 1.9 %    Differential Method Automated        Microbiology Results (last 7 days)       ** No results found for the last 168 hours. **             Imaging Results              CTA Acute GI [place], Abdomen and Pelvis (Final result)  Result time 04/27/24 06:54:41      Final result by Lucio Hall MD (04/27/24 06:54:41)                   Impression:      No acute findings in the abdomen or pelvis.      Electronically signed by: Lucio Hall MD  Date:    04/27/2024  Time:    06:54               Narrative:    EXAMINATION:  CTA ACUTE GI BLEED, ABDOMEN AND PELVIS    CLINICAL HISTORY:  suspect upper GI bleed, epigastric abdominal pain;    TECHNIQUE:  Helical CT images of the abdomen and pelvis were obtained prior to and after the IV administration of 75 mL of Omnipaque 350 .  Oral contrast was not given. Post-contrast images were obtained in the arterial and delayed phases per GI bleed protocol.  Axial, coronal, and sagittal reconstructions  were created from the source data, including MIP reconstructions for the arterial phase images.    COMPARISON:  04/20/2024.    FINDINGS:  Heart: Normal in size. No pericardial effusion. No significant calcific coronary atherosclerosis.    Lungs: Visualized portions of the lungs are clear.    Liver: Normal in size and contour.  No focal hepatic lesion.    Gallbladder: No calcified gallstones.    Bile Ducts: No evidence of dilated ducts.    Pancreas: No mass or peripancreatic fat stranding.    Spleen: Unremarkable.    Stomach and duodenum: Unremarkable.    Adrenals: Unremarkable.    Kidneys/ Ureters: Normal in size and location. Normal enhancement. No hydronephrosis or nephrolithiasis. No ureteral dilatation.    Bladder: No evidence of wall thickening.    Reproductive organs: Unremarkable.    Bowel/Mesentery: Small bowel is normal in caliber with no evidence of obstruction. No evidence of inflammation or wall thickening.  Colon demonstrates no focal wall thickening.  No abnormal enhancement identified to suggest GI bleeding source.    Peritoneum: No intraperitoneal free air or fluid    Lymph nodes: No retroperitoneal lymphadenopathy.    Vasculature: No aneurysm. No significant calcific atherosclerosis.    Abdominal wall:  Unremarkable.    Bones: No acute fracture. No suspicious osseous lesions.                                       X-Ray Abdomen Flat And Erect (Final result)  Result time 04/27/24 03:36:27      Final result by Nicholas Anderson MD (04/27/24 03:36:27)                   Impression:      Nonobstructed bowel-gas pattern.      Electronically signed by: Nicholas Anderson MD  Date:    04/27/2024  Time:    03:36               Narrative:    EXAMINATION:  XR ABDOMEN FLAT AND ERECT    CLINICAL HISTORY:  Unspecified abdominal pain    TECHNIQUE:  Flat and erect AP views of the abdomen were preformed.    COMPARISON:  None    FINDINGS:  There are no calcifications overlying the kidneys.  Bowel gas pattern is  non-obstructive.  No evidence for pneumoperitoneum.  Regional osseous structures are unremarkable.                                       X-Ray Chest 1 View (Final result)  Result time 04/27/24 03:24:56      Final result by Nicholas Anderson MD (04/27/24 03:24:56)                   Impression:      No acute findings in the chest.      Electronically signed by: Nicholas Anderson MD  Date:    04/27/2024  Time:    03:24               Narrative:    EXAMINATION:  XR CHEST 1 VIEW    CLINICAL HISTORY:  Chest pain, unspecified    TECHNIQUE:  Single frontal view of the chest was performed.    COMPARISON:  11/13/2020.    FINDINGS:  No consolidation, pleural effusion or pneumothorax.    Cardiomediastinal silhouette is unremarkable.                                             Assessment/Plan:      * Upper GI bleed  -Patient presents with 1 episode of hematemesis with dark black blood noticed and several other episodes vomiting since yesterday.  Patient seen at [facility] on and [facility] over this past week for similar symptoms. Patient complains of epigastric abdominal pain, last bowel movement 1.5 weeks ago. Also endorses nonradiating chest tightness/chest pain with troponin levels slightly bumped to 0.027 in the setting of normal EKG without ischemic changes.   -Hemoglobin from 04/20/2024 was 16.3 dropped to 10.2 today     Plan:  -Started on GIB pathway.  -Keep patient NPO.  -Follow H/H closely. Type and screen blood and transfuse as needed.  -Received 80mg IV Protonix in the ED, continue 40mg IV BID  -IV fluids 100 cc/hour  -CTA abdomen pelvis ordered  -GI consulted and would appreciate recommendations    Elevated troponin  -Patient complaints of chest pain/chest tightness with slightly initial troponin 0.027  -EKG shows NSR with no ischemic changes  -Trend troponin  -Echocardiogram ordered  -Slight bump in troponin likely due to demand however continue to monitor symptoms    Abdominal pain  -see plan above      VTE Risk  Mitigation (From admission, onward)           Ordered     IP VTE LOW RISK PATIENT  Once         04/27/24 0358                    Discharge Planning   KENIA:      Code Status: Full Code   Is the patient medically ready for discharge?:     Reason for patient still in hospital (select all that apply): Patient trending condition and Treatment  Discharge Plan A: Home                  Ramsey Gregorio MD  Department of Hospital Medicine   [facility]

## 2024-04-29 ENCOUNTER — ANESTHESIA EVENT (OUTPATIENT)
Dept: ENDOSCOPY | Facility: HOSPITAL | Age: 34
End: 2024-04-29

## 2024-04-29 ENCOUNTER — ANESTHESIA (OUTPATIENT)
Dept: ENDOSCOPY | Facility: HOSPITAL | Age: 34
End: 2024-04-29

## 2024-04-29 ENCOUNTER — PATIENT MESSAGE (OUTPATIENT)
Dept: GASTROENTEROLOGY | Facility: HOSPITAL | Age: 34
End: 2024-04-29

## 2024-04-29 VITALS
HEART RATE: 75 BPM | HEIGHT: 71 IN | TEMPERATURE: 98 F | BODY MASS INDEX: 21.7 KG/M2 | WEIGHT: 155 LBS | DIASTOLIC BLOOD PRESSURE: 70 MMHG | SYSTOLIC BLOOD PRESSURE: 133 MMHG | RESPIRATION RATE: 18 BRPM | OXYGEN SATURATION: 94 %

## 2024-04-29 DIAGNOSIS — K26.9 DUODENAL ULCER: Primary | ICD-10-CM

## 2024-04-29 LAB
BASOPHILS # BLD AUTO: 0.02 K/UL (ref 0–0.2)
BASOPHILS NFR BLD: 0.3 % (ref 0–1.9)
DIFFERENTIAL METHOD BLD: ABNORMAL
EOSINOPHIL # BLD AUTO: 0.1 K/UL (ref 0–0.5)
EOSINOPHIL NFR BLD: 1.1 % (ref 0–8)
ERYTHROCYTE [DISTWIDTH] IN BLOOD BY AUTOMATED COUNT: 12.5 % (ref 11.5–14.5)
HCT VFR BLD AUTO: 30.6 % (ref 40–54)
HGB BLD-MCNC: 10.1 G/DL (ref 14–18)
IMM GRANULOCYTES # BLD AUTO: 0.02 K/UL (ref 0–0.04)
IMM GRANULOCYTES NFR BLD AUTO: 0.3 % (ref 0–0.5)
LYMPHOCYTES # BLD AUTO: 2 K/UL (ref 1–4.8)
LYMPHOCYTES NFR BLD: 32.5 % (ref 18–48)
MCH RBC QN AUTO: 30.7 PG (ref 27–31)
MCHC RBC AUTO-ENTMCNC: 33 G/DL (ref 32–36)
MCV RBC AUTO: 93 FL (ref 82–98)
MONOCYTES # BLD AUTO: 0.4 K/UL (ref 0.3–1)
MONOCYTES NFR BLD: 7.2 % (ref 4–15)
NEUTROPHILS # BLD AUTO: 3.6 K/UL (ref 1.8–7.7)
NEUTROPHILS NFR BLD: 58.6 % (ref 38–73)
NRBC BLD-RTO: 0 /100 WBC
PLATELET # BLD AUTO: 222 K/UL (ref 150–450)
PMV BLD AUTO: 12.9 FL (ref 9.2–12.9)
RBC # BLD AUTO: 3.29 M/UL (ref 4.6–6.2)
WBC # BLD AUTO: 6.09 K/UL (ref 3.9–12.7)

## 2024-04-29 PROCEDURE — 25000003 PHARM REV CODE 250: Performed by: STUDENT IN AN ORGANIZED HEALTH CARE EDUCATION/TRAINING PROGRAM

## 2024-04-29 PROCEDURE — 85025 COMPLETE CBC W/AUTO DIFF WBC: CPT

## 2024-04-29 PROCEDURE — D9220A PRA ANESTHESIA: Mod: ,,, | Performed by: ANESTHESIOLOGY

## 2024-04-29 PROCEDURE — 88312 SPECIAL STAINS GROUP 1: CPT | Mod: 26,,, | Performed by: PATHOLOGY

## 2024-04-29 PROCEDURE — 37000008 HC ANESTHESIA 1ST 15 MINUTES: Performed by: INTERNAL MEDICINE

## 2024-04-29 PROCEDURE — 88341 IMHCHEM/IMCYTCHM EA ADD ANTB: CPT | Performed by: PATHOLOGY

## 2024-04-29 PROCEDURE — 88341 IMHCHEM/IMCYTCHM EA ADD ANTB: CPT | Mod: 26,,, | Performed by: PATHOLOGY

## 2024-04-29 PROCEDURE — 88342 IMHCHEM/IMCYTCHM 1ST ANTB: CPT | Performed by: PATHOLOGY

## 2024-04-29 PROCEDURE — 36415 COLL VENOUS BLD VENIPUNCTURE: CPT | Performed by: STUDENT IN AN ORGANIZED HEALTH CARE EDUCATION/TRAINING PROGRAM

## 2024-04-29 PROCEDURE — 27201012 HC FORCEPS, HOT/COLD, DISP: Performed by: INTERNAL MEDICINE

## 2024-04-29 PROCEDURE — 63600175 PHARM REV CODE 636 W HCPCS

## 2024-04-29 PROCEDURE — S4991 NICOTINE PATCH NONLEGEND: HCPCS | Performed by: STUDENT IN AN ORGANIZED HEALTH CARE EDUCATION/TRAINING PROGRAM

## 2024-04-29 PROCEDURE — 25000003 PHARM REV CODE 250

## 2024-04-29 PROCEDURE — G0378 HOSPITAL OBSERVATION PER HR: HCPCS

## 2024-04-29 PROCEDURE — 88342 IMHCHEM/IMCYTCHM 1ST ANTB: CPT | Mod: 26,,, | Performed by: PATHOLOGY

## 2024-04-29 PROCEDURE — 37000009 HC ANESTHESIA EA ADD 15 MINS: Performed by: INTERNAL MEDICINE

## 2024-04-29 PROCEDURE — 43239 EGD BIOPSY SINGLE/MULTIPLE: CPT | Performed by: INTERNAL MEDICINE

## 2024-04-29 PROCEDURE — 88305 TISSUE EXAM BY PATHOLOGIST: CPT | Performed by: PATHOLOGY

## 2024-04-29 PROCEDURE — 88312 SPECIAL STAINS GROUP 1: CPT | Performed by: PATHOLOGY

## 2024-04-29 PROCEDURE — 96376 TX/PRO/DX INJ SAME DRUG ADON: CPT

## 2024-04-29 PROCEDURE — 43239 EGD BIOPSY SINGLE/MULTIPLE: CPT | Mod: ,,, | Performed by: INTERNAL MEDICINE

## 2024-04-29 PROCEDURE — 36415 COLL VENOUS BLD VENIPUNCTURE: CPT

## 2024-04-29 PROCEDURE — 88305 TISSUE EXAM BY PATHOLOGIST: CPT | Mod: 26,,, | Performed by: PATHOLOGY

## 2024-04-29 PROCEDURE — C9113 INJ PANTOPRAZOLE SODIUM, VIA: HCPCS

## 2024-04-29 PROCEDURE — 63600175 PHARM REV CODE 636 W HCPCS: Performed by: STUDENT IN AN ORGANIZED HEALTH CARE EDUCATION/TRAINING PROGRAM

## 2024-04-29 RX ORDER — PANTOPRAZOLE SODIUM 40 MG/1
40 TABLET, DELAYED RELEASE ORAL 2 TIMES DAILY
Qty: 168 TABLET | Refills: 0 | Status: SHIPPED | OUTPATIENT
Start: 2024-04-29 | End: 2024-07-22

## 2024-04-29 RX ORDER — PANTOPRAZOLE SODIUM 40 MG/1
40 TABLET, DELAYED RELEASE ORAL DAILY
Qty: 84 TABLET | Refills: 0 | Status: SHIPPED | OUTPATIENT
Start: 2024-04-29 | End: 2024-04-29

## 2024-04-29 RX ORDER — PROPOFOL 10 MG/ML
VIAL (ML) INTRAVENOUS
Status: DISCONTINUED | OUTPATIENT
Start: 2024-04-29 | End: 2024-04-29

## 2024-04-29 RX ORDER — PHENYLEPHRINE HCL IN 0.9% NACL 1 MG/10 ML
SYRINGE (ML) INTRAVENOUS
Status: DISCONTINUED
Start: 2024-04-29 | End: 2024-04-29 | Stop reason: HOSPADM

## 2024-04-29 RX ORDER — LIDOCAINE HYDROCHLORIDE 20 MG/ML
INJECTION, SOLUTION EPIDURAL; INFILTRATION; INTRACAUDAL; PERINEURAL
Status: DISCONTINUED
Start: 2024-04-29 | End: 2024-04-29 | Stop reason: HOSPADM

## 2024-04-29 RX ORDER — PROPOFOL 10 MG/ML
VIAL (ML) INTRAVENOUS
Status: DISCONTINUED
Start: 2024-04-29 | End: 2024-04-29 | Stop reason: HOSPADM

## 2024-04-29 RX ORDER — LIDOCAINE HYDROCHLORIDE 40 MG/ML
SOLUTION TOPICAL
Status: DISCONTINUED | OUTPATIENT
Start: 2024-04-29 | End: 2024-04-29

## 2024-04-29 RX ORDER — LIDOCAINE HYDROCHLORIDE 20 MG/ML
INJECTION INTRAVENOUS
Status: DISCONTINUED | OUTPATIENT
Start: 2024-04-29 | End: 2024-04-29

## 2024-04-29 RX ORDER — PHENYLEPHRINE HYDROCHLORIDE 10 MG/ML
INJECTION INTRAVENOUS
Status: DISCONTINUED | OUTPATIENT
Start: 2024-04-29 | End: 2024-04-29

## 2024-04-29 RX ADMIN — PHENYLEPHRINE HYDROCHLORIDE 100 MCG: 10 INJECTION INTRAVENOUS at 10:04

## 2024-04-29 RX ADMIN — PROPOFOL 140 MG: 10 INJECTION, EMULSION INTRAVENOUS at 10:04

## 2024-04-29 RX ADMIN — PANTOPRAZOLE SODIUM 40 MG: 40 INJECTION, POWDER, FOR SOLUTION INTRAVENOUS at 08:04

## 2024-04-29 RX ADMIN — ACETAMINOPHEN 650 MG: 325 TABLET ORAL at 03:04

## 2024-04-29 RX ADMIN — Medication 1 PATCH: at 08:04

## 2024-04-29 RX ADMIN — LIDOCAINE HYDROCHLORIDE 60 MG: 20 INJECTION, SOLUTION INTRAVENOUS at 10:04

## 2024-04-29 RX ADMIN — LIDOCAINE HYDROCHLORIDE 3 ML: 40 SOLUTION TOPICAL at 10:04

## 2024-04-29 RX ADMIN — SODIUM CHLORIDE: 0.9 INJECTION, SOLUTION INTRAVENOUS at 10:04

## 2024-04-29 RX ADMIN — PROPOFOL 50 MG: 10 INJECTION, EMULSION INTRAVENOUS at 10:04

## 2024-04-29 NOTE — TRANSFER OF CARE
"Anesthesia Transfer of Care Note    Patient: Emy Baig    Procedure(s) Performed: Procedure(s) (LRB):  EGD (ESOPHAGOGASTRODUODENOSCOPY) (N/A)    Patient location: GI    Anesthesia Type: general    Transport from OR: Transported from OR on room air with adequate spontaneous ventilation    Post pain: adequate analgesia    Post assessment: no apparent anesthetic complications and tolerated procedure well    Post vital signs: stable    Level of consciousness: sedated    Nausea/Vomiting: no nausea/vomiting    Complications: none    Transfer of care protocol was followed      Last vitals: Visit Vitals  BP (!) 92/52 (BP Location: Left arm, Patient Position: Lying)   Pulse 66   Temp 36.5 °C (97.7 °F) (Oral)   Resp 20   Ht 5' 11" (1.803 m)   Wt 70.3 kg (154 lb 15.7 oz)   SpO2 100%   BMI 21.62 kg/m²     "

## 2024-04-29 NOTE — NURSING
Patient awake alert and oriented, able to make needs known, self transferred to stretcher and off unit to endo, denies pain/discomfort at this time, wife at bedside.

## 2024-04-29 NOTE — PROGRESS NOTES
[facility] Medicine  Progress Note    Patient Name: Emy Baig  MRN: [id number]  Patient Class: OP- Observation   Admission Date: 4/27/2024  Length of Stay: 0 days  Attending Physician: Ramsey Gregorio MD  Primary Care Provider: Angelique, Primary Doctor        Subjective:     Principal Problem:Upper GI bleed        HPI:  Emy Baig is a 33 y.o. with a past medical history of cannabinoid hyperemesis syndrome presents to the hospital with complaints of abdominal pain, chest tightness, lightheadedness, nausea, and vomiting for the past several days worsening last night.    Patient states that he has a decreased appetite and has been feeling lightheaded and fatigued along with chest pain/tightness which is sharp and intermittent in nature. He states that he had an episode of hematemesis yesterday and noticed dark blood. He states that his other episodes of vomiting, he did not notice any blood. He is unable to tolerate p.o. keep anything down. Patient also endorses abdominal pain and states that his last bowel movement being 1.5 weeks ago. He has tried laxatives and Zofran with minimal relief. He also states that he had an episode of hematemesis. Patient states that he smokes half a pack of cigarette daily and denies using any drugs or alcohol. Of note patient was seen for similar symptoms at [facility] and [facility] for the past week. Patient denies any other alleviating or exacerbating factors. Patient denies headache, fever or chills, shortness on breath, diarrhea, lower extremity swelling, or any other associated symptoms.    In the ED:  Patient afebrile without leukocytosis, hemodynamically stable at 99% O2 sat on RA, H and H 10.2/28.8 (recent H&H from 04/20/2024 shows 16.3/48.8), sodium 134, anion gap 6, troponin 0.027, chest x-ray shows no acute findings, x-ray abdomen shows nonobstructive bowel gas pattern with no evidence of pneumoperitoneum.  Patient given diphenhydramine 25 mg IV,  metoclopramide 5 mg IV, and 1 L LR bolus in the ED. Case discussed with ED provider and patient we will be placed under observation for further medical management.        Overview/Hospital Course:  33 y.o. with a past medical history of cannabinoid hyperemesis syndrome and illicit drug use (last use of Xtasy one month ago) with complaints of abdominal pain, constipation, nausea/vomiting and episode of hematemesis. Also noted to have a black tarry bowel movement prior to coming to the floor. Take shots of liquor occasionally (1pint lasts a month). Pt started on GI bleed pathway. On IV PPI BID. GI consulted. Continue pantoprazole 40 mg IV BID. Appreciate GI input. Monitor Hgb. Clear liquids today, EGD in am 4/29.    Hb stable, suspect mauricio-payne tear that has now healed/stopped bleeding.    Interval History:  NAEON.  No new issues.   CC nausea  All questions answered and updates on care given.       ROS:  General: Negative for fevers or chills.  Cardiac: Negative for chest pain or orthopnea   Pulmonary: Negative for dyspnea or wheezing.  GI: Negative for abdominal distention or pain     Vitals:    04/28/24 2018 04/28/24 2339 04/29/24 0444 04/29/24 0707   BP: 119/72 125/74 111/62 119/65   BP Location: Right arm Right arm Right arm    Patient Position: Lying Lying Lying Lying   Pulse: 74 67 (!) 58 64   Resp: 18 17 17 18   Temp: 98.7 °F (37.1 °C) 98.6 °F (37 °C) 98.4 °F (36.9 °C) 98.7 °F (37.1 °C)   TempSrc: Oral Oral Oral Oral   SpO2: 100% 100% 100% 99%   Weight:       Height:              Body mass index is 21.62 kg/m².      PHYSICAL EXAM:  GENERAL APPEARANCE: alert and cooperative, and appears to be in no acute distress.  HEENT:     HEAD: NC/AT     EYES: PERRL, EOMI.  Vision is grossly intact.  NECK: Neck supple, non-tender without LAD, masses or thyromegaly.  CARDIAC: There is no peripheral edema, cyanosis or pallor.   LUNGS: Clear to auscultation and percussion without rales or wheezing  ABDOMEN: Non-distended.  No guarding.  MSK: No joint erythema or tenderness.   EXTREMITIES: No significant deformity or joint abnormality. No edema.   NEUROLOGICAL: CN II-XII grossly intact.   SKIN: Skin normal color, texture and turgor with no lesions or eruptions.  PSYCHIATRIC: Oriented. No tangential speech. No Hyperactive features.        Recent Results (from the past 24 hour(s))   CBC auto differential    Collection Time: 04/29/24  4:37 AM   Result Value Ref Range    WBC 6.09 3.90 - 12.70 K/uL    RBC 3.29 (L) 4.60 - 6.20 M/uL    Hemoglobin 10.1 (L) 14.0 - 18.0 g/dL    Hematocrit 30.6 (L) 40.0 - 54.0 %    MCV 93 82 - 98 fL    MCH 30.7 27.0 - 31.0 pg    MCHC 33.0 32.0 - 36.0 g/dL    RDW 12.5 11.5 - 14.5 %    Platelets 222 150 - 450 K/uL    MPV 12.9 9.2 - 12.9 fL    Immature Granulocytes 0.3 0.0 - 0.5 %    Gran # (ANC) 3.6 1.8 - 7.7 K/uL    Immature Grans (Abs) 0.02 0.00 - 0.04 K/uL    Lymph # 2.0 1.0 - 4.8 K/uL    Mono # 0.4 0.3 - 1.0 K/uL    Eos # 0.1 0.0 - 0.5 K/uL    Baso # 0.02 0.00 - 0.20 K/uL    nRBC 0 0 /100 WBC    Gran % 58.6 38.0 - 73.0 %    Lymph % 32.5 18.0 - 48.0 %    Mono % 7.2 4.0 - 15.0 %    Eosinophil % 1.1 0.0 - 8.0 %    Basophil % 0.3 0.0 - 1.9 %    Differential Method Automated        Microbiology Results (last 7 days)       ** No results found for the last 168 hours. **             Imaging Results              CTA Acute GI [place], Abdomen and Pelvis (Final result)  Result time 04/27/24 06:54:41      Final result by Lucio Hall MD (04/27/24 06:54:41)                   Impression:      No acute findings in the abdomen or pelvis.      Electronically signed by: Lucio Hall MD  Date:    04/27/2024  Time:    06:54               Narrative:    EXAMINATION:  CTA ACUTE GI BLEED, ABDOMEN AND PELVIS    CLINICAL HISTORY:  suspect upper GI bleed, epigastric abdominal pain;    TECHNIQUE:  Helical CT images of the abdomen and pelvis were obtained prior to and after the IV administration of 75 mL of Omnipaque 350 .  Oral  contrast was not given. Post-contrast images were obtained in the arterial and delayed phases per GI bleed protocol.  Axial, coronal, and sagittal reconstructions were created from the source data, including MIP reconstructions for the arterial phase images.    COMPARISON:  04/20/2024.    FINDINGS:  Heart: Normal in size. No pericardial effusion. No significant calcific coronary atherosclerosis.    Lungs: Visualized portions of the lungs are clear.    Liver: Normal in size and contour.  No focal hepatic lesion.    Gallbladder: No calcified gallstones.    Bile Ducts: No evidence of dilated ducts.    Pancreas: No mass or peripancreatic fat stranding.    Spleen: Unremarkable.    Stomach and duodenum: Unremarkable.    Adrenals: Unremarkable.    Kidneys/ Ureters: Normal in size and location. Normal enhancement. No hydronephrosis or nephrolithiasis. No ureteral dilatation.    Bladder: No evidence of wall thickening.    Reproductive organs: Unremarkable.    Bowel/Mesentery: Small bowel is normal in caliber with no evidence of obstruction. No evidence of inflammation or wall thickening.  Colon demonstrates no focal wall thickening.  No abnormal enhancement identified to suggest GI bleeding source.    Peritoneum: No intraperitoneal free air or fluid    Lymph nodes: No retroperitoneal lymphadenopathy.    Vasculature: No aneurysm. No significant calcific atherosclerosis.    Abdominal wall:  Unremarkable.    Bones: No acute fracture. No suspicious osseous lesions.                                       X-Ray Abdomen Flat And Erect (Final result)  Result time 04/27/24 03:36:27      Final result by Nicholas Anderson MD (04/27/24 03:36:27)                   Impression:      Nonobstructed bowel-gas pattern.      Electronically signed by: Nicholas Anderson MD  Date:    04/27/2024  Time:    03:36               Narrative:    EXAMINATION:  XR ABDOMEN FLAT AND ERECT    CLINICAL HISTORY:  Unspecified abdominal pain    TECHNIQUE:  Flat and  erect AP views of the abdomen were preformed.    COMPARISON:  None    FINDINGS:  There are no calcifications overlying the kidneys.  Bowel gas pattern is non-obstructive.  No evidence for pneumoperitoneum.  Regional osseous structures are unremarkable.                                       X-Ray Chest 1 View (Final result)  Result time 04/27/24 03:24:56      Final result by Nicholas Anderson MD (04/27/24 03:24:56)                   Impression:      No acute findings in the chest.      Electronically signed by: Nicholas Anderson MD  Date:    04/27/2024  Time:    03:24               Narrative:    EXAMINATION:  XR CHEST 1 VIEW    CLINICAL HISTORY:  Chest pain, unspecified    TECHNIQUE:  Single frontal view of the chest was performed.    COMPARISON:  11/13/2020.    FINDINGS:  No consolidation, pleural effusion or pneumothorax.    Cardiomediastinal silhouette is unremarkable.                                             Assessment/Plan:      * Upper GI bleed  -Patient presents with 1 episode of hematemesis with dark black blood noticed and several other episodes vomiting since yesterday.  Patient seen at [facility] on and [facility] over this past week for similar symptoms. Patient complains of epigastric abdominal pain, last bowel movement 1.5 weeks ago. Also endorses nonradiating chest tightness/chest pain with troponin levels slightly bumped to 0.027 in the setting of normal EKG without ischemic changes.   -Hemoglobin from 04/20/2024 was 16.3 dropped to 10.2 today     Plan:  -Started on GIB pathway.  -Keep patient NPO.  -Follow H/H closely. Type and screen blood and transfuse as needed.  -Received 80mg IV Protonix in the ED, continue 40mg IV BID  -IV fluids 100 cc/hour  -CTA abdomen pelvis ordered  -GI consulted and would appreciate recommendations    Elevated troponin  -Patient complaints of chest pain/chest tightness with slightly initial troponin 0.027  -EKG shows NSR with no ischemic changes  -Trend  troponin  -Echocardiogram ordered  -Slight bump in troponin likely due to demand however continue to monitor symptoms    Abdominal pain  -see plan above      VTE Risk Mitigation (From admission, onward)           Ordered     IP VTE LOW RISK PATIENT  Once         04/27/24 0358                    Discharge Planning   KENIA:      Code Status: Full Code   Is the patient medically ready for discharge?:     Reason for patient still in hospital (select all that apply): Patient trending condition and Treatment  Discharge Plan A: Home                  Ramsey Gregorio MD  Department of Hospital Medicine   [facility]

## 2024-04-29 NOTE — DISCHARGE SUMMARY
[facility]  Discharge Summary      Patient Name: Emy Baig  MRN: [id number]  [facility]: [id number]  Patient Class: OP- Observation  Admission Date: 4/27/2024  Hospital Length of Stay: 0 days  Discharge Date and Time:  04/29/2024 12:00 PM  Attending Physician: Ramsey Gregorio MD   Discharging Provider: Ramsey Gregorio MD  Primary Care Provider: Angelique, Primary Doctor    Primary Care Team: Networked reference to record PCT     HPI:   Emy Baig is a 33 y.o. with a past medical history of cannabinoid hyperemesis syndrome presents to the hospital with complaints of abdominal pain, chest tightness, lightheadedness, nausea, and vomiting for the past several days worsening last night.    Patient states that he has a decreased appetite and has been feeling lightheaded and fatigued along with chest pain/tightness which is sharp and intermittent in nature. He states that he had an episode of hematemesis yesterday and noticed dark blood. He states that his other episodes of vomiting, he did not notice any blood. He is unable to tolerate p.o. keep anything down. Patient also endorses abdominal pain and states that his last bowel movement being 1.5 weeks ago. He has tried laxatives and Zofran with minimal relief. He also states that he had an episode of hematemesis. Patient states that he smokes half a pack of cigarette daily and denies using any drugs or alcohol. Of note patient was seen for similar symptoms at [facility] and [facility] for the past week. Patient denies any other alleviating or exacerbating factors. Patient denies headache, fever or chills, shortness on breath, diarrhea, lower extremity swelling, or any other associated symptoms.    In the ED:  Patient afebrile without leukocytosis, hemodynamically stable at 99% O2 sat on RA, H and H 10.2/28.8 (recent H&H from 04/20/2024 shows 16.3/48.8), sodium 134, anion gap 6, troponin 0.027, chest x-ray shows no acute findings, x-ray abdomen shows  nonobstructive bowel gas pattern with no evidence of pneumoperitoneum.  Patient given diphenhydramine 25 mg IV, metoclopramide 5 mg IV, and 1 L LR bolus in the ED. Case discussed with ED provider and patient we will be placed under observation for further medical management.        Procedure(s) (LRB):  EGD (ESOPHAGOGASTRODUODENOSCOPY) (N/A)      Hospital Course:   33 y.o. with a past medical history of cannabinoid hyperemesis syndrome and illicit drug use (last use of Xtasy one month ago) with complaints of abdominal pain, constipation, nausea/vomiting and episode of hematemesis. Also noted to have a black tarry bowel movement prior to coming to the floor. Take shots of liquor occasionally (1pint lasts a month). Pt started on GI bleed pathway. On IV PPI BID. GI consulted. Continue pantoprazole 40 mg IV BID. Appreciate GI input. Monitor Hgb. Clear liquids today, EGD in am 4/29.    Hb stable, suspect mauricio-payne tear that has now healed/stopped bleeding.       Take protonix twice daily for 12 weeks, then follow up with GI for potential re-scope  Go to PCP this week and recheck you      Thank you for trusting [facility] and me with your care.  We are honored that you entrusted us with your healthcare needs. Your satisfaction is very important to us and we hope you have been very pleased with your experience at [facility]. After your discharge you may receive a survey asking you to rate your hospital experience- Please help us by completing this survey. We hope that you have received the very best care possible during your hospitalization at [facility], as your satisfaction is our top priority.    Let me know if there is anything more I can do!!              Ramsey Gregorio MD        Medical Director        Section Head of         Board-Certified IM Attending    Goals of Care Treatment Preferences:  Code Status: Full Code      Consults:   Consults (From admission, onward)          Status  Ordering Provider     Inpatient consult to Gastroenterology  Once        Provider:  Candy Lees MD    Completed EDDY CHAUHAN            No new Assessment & Plan notes have been filed under this hospital service since the last note was generated.  Service: Hospital Medicine    Final Active Diagnoses:    Diagnosis Date Noted POA    PRINCIPAL PROBLEM:  Upper GI bleed [K92.2] 04/27/2024 Yes    Abdominal pain [R10.9] 04/27/2024 Yes    Elevated troponin [R79.89] 04/27/2024 Yes      Problems Resolved During this Admission:       Discharged Condition: good    Disposition: Home or Self Care    Follow Up:   Follow-up Information       [facility] [place] Schedule an appointment as soon as possible for a visit.    Why: Call to schedule an appointment to establish care for all future medical needs. Clinic opens at 7am and offers same day appointments  Contact information:  [address] [place] [id number]  [phone]                           Patient Instructions:      Ambulatory referral/consult to Internal Medicine   Standing Status: Future   Referral Priority: Routine Referral Type: Consultation   Referral Reason: Specialty Services Required   Requested Specialty: Internal Medicine   Number of Visits Requested: 1     Ambulatory referral/consult to Gastroenterology   Standing Status: Future   Referral Priority: Routine Referral Type: Consultation   Referral Reason: Specialty Services Required   Requested Specialty: Gastroenterology   Number of Visits Requested: 1       Significant Diagnostic Studies:   Recent Results (from the past 100 hour(s))   LIPASE    Collection Time: 04/25/24  8:19 AM   Result Value Ref Range    Lipase 27 <90 U/L   CBC auto differential    Collection Time: 04/27/24  1:59 AM   Result Value Ref Range    WBC 6.76 3.90 - 12.70 K/uL    RBC 3.34 (L) 4.60 - 6.20 M/uL    Hemoglobin 10.2 (L) 14.0 - 18.0 g/dL    Hematocrit 28.8 (L) 40.0 - 54.0 %    MCV 86 82 - 98 fL    MCH 30.5 27.0  - 31.0 pg    MCHC 35.4 32.0 - 36.0 g/dL    RDW 12.1 11.5 - 14.5 %    Platelets 172 150 - 450 K/uL    MPV 12.9 9.2 - 12.9 fL    Immature Granulocytes 0.3 0.0 - 0.5 %    Gran # (ANC) 5.1 1.8 - 7.7 K/uL    Immature Grans (Abs) 0.02 0.00 - 0.04 K/uL    Lymph # 1.4 1.0 - 4.8 K/uL    Mono # 0.3 0.3 - 1.0 K/uL    Eos # 0.0 0.0 - 0.5 K/uL    Baso # 0.02 0.00 - 0.20 K/uL    nRBC 0 0 /100 WBC    Gran % 74.7 (H) 38.0 - 73.0 %    Lymph % 20.1 18.0 - 48.0 %    Mono % 4.6 4.0 - 15.0 %    Eosinophil % 0.0 0.0 - 8.0 %    Basophil % 0.3 0.0 - 1.9 %    Differential Method Automated    Comprehensive metabolic panel    Collection Time: 04/27/24  1:59 AM   Result Value Ref Range    Sodium 134 (L) 136 - 145 mmol/L    Potassium 3.7 3.5 - 5.1 mmol/L    Chloride 100 95 - 110 mmol/L    CO2 28 23 - 29 mmol/L    Glucose 113 (H) 70 - 110 mg/dL    BUN 15 6 - 20 mg/dL    Creatinine 0.9 0.5 - 1.4 mg/dL    Calcium 9.2 8.7 - 10.5 mg/dL    Total Protein 7.1 6.0 - 8.4 g/dL    Albumin 3.9 3.5 - 5.2 g/dL    Total Bilirubin 0.4 0.1 - 1.0 mg/dL    Alkaline Phosphatase 69 55 - 135 U/L    AST 29 10 - 40 U/L    ALT 27 10 - 44 U/L    eGFR >60 >60 mL/min/1.73 m^2    Anion Gap 6 (L) 8 - 16 mmol/L   Troponin I    Collection Time: 04/27/24  1:59 AM   Result Value Ref Range    Troponin I 0.027 (H) 0.000 - 0.026 ng/mL   Lipase    Collection Time: 04/27/24  1:59 AM   Result Value Ref Range    Lipase 45 4 - 60 U/L   Iron and TIBC    Collection Time: 04/27/24  1:59 AM   Result Value Ref Range    Iron 75 45 - 160 ug/dL    Transferrin 292 200 - 375 mg/dL    TIBC 432 250 - 450 ug/dL    Saturated Iron 17 (L) 20 - 50 %   Protime-INR    Collection Time: 04/27/24  4:08 AM   Result Value Ref Range    Prothrombin Time 10.9 9.0 - 12.5 sec    INR 1.0 0.8 - 1.2   Type & Screen    Collection Time: 04/27/24  4:08 AM   Result Value Ref Range    Group & Rh O POS     Indirect Ruth Ann NEG     Specimen Outdate 04/30/2024 23:59    Drug screen panel, emergency    Collection Time: 04/27/24   4:13 AM   Result Value Ref Range    Benzodiazepines Negative Negative    Methadone metabolites Negative Negative    Cocaine (Metab.) Negative Negative    Opiate Scrn, Ur Negative Negative    Barbiturate Screen, Ur Negative Negative    Amphetamine Screen, Ur Negative Negative    THC Negative Negative    Phencyclidine Negative Negative    Creatinine, Urine 56.2 23.0 - 375.0 mg/dL    Toxicology Information SEE COMMENT    Urinalysis, Reflex to Urine Culture Urine, Clean Catch    Collection Time: 04/27/24  4:13 AM    Specimen: Urine   Result Value Ref Range    Specimen UA Urine, Clean Catch     Color, UA Yellow Yellow, Straw, Samra    Appearance, UA Hazy (A) Clear    pH, UA 8.0 5.0 - 8.0    Specific Gravity, UA 1.015 1.005 - 1.030    Protein, UA Negative Negative    Glucose, UA Negative Negative    Ketones, UA Negative Negative    Bilirubin (UA) Negative Negative    Occult Blood UA Negative Negative    Nitrite, UA Negative Negative    Urobilinogen, UA Negative <2.0 EU/dL    Leukocytes, UA Negative Negative   Echo    Collection Time: 04/27/24  7:25 AM   Result Value Ref Range    BSA 1.91 m2    LVOT stroke volume 96.71 cm3    LVIDd 4.98 3.5 - 6.0 cm    LV Systolic Volume 36.65 mL    LV Systolic Volume Index 19.1 mL/m2    LVIDs 3.06 2.1 - 4.0 cm    LV Diastolic Volume 117.11 mL    LV Diastolic Volume Index 60.99 mL/m2    IVS 1.34 (A) 0.6 - 1.1 cm    LVOT diameter 2.34 cm    LVOT area 4.3 cm2    FS 39 28 - 44 %    Left Ventricle Relative Wall Thickness 0.48 cm    Posterior Wall 1.20 (A) 0.6 - 1.1 cm    LV mass 251.66 g    LV Mass Index 131 g/m2    MV Peak E Roberto 0.89 m/s    TDI LATERAL 0.19 m/s    TDI SEPTAL 0.15 m/s    E/E' ratio 5.24 m/s    MV Peak A Roberto 0.54 m/s    TR Max Roberto 1.43 m/s    E/A ratio 1.65     IVRT 65.65 msec    E wave deceleration time 194.15 msec    LV SEPTAL E/E' RATIO 5.93 m/s    LV LATERAL E/E' RATIO 4.68 m/s    LVOT peak roberto 1.35 m/s    Left Ventricular Outflow Tract Mean Velocity 0.85 cm/s    Left  Ventricular Outflow Tract Mean Gradient 3.36 mmHg    RVDD 3.25 cm    RV S' 18.90 cm/s    TAPSE 1.95 cm    RV/LV Ratio 0.65 cm    LA size 3.14 cm    Left Atrium Minor Axis 4.56 cm    Left Atrium Major Axis 4.18 cm    RA Major Axis 4.30 cm    AV mean gradient 5 mmHg    AV peak gradient 9 mmHg    Ao peak tunde 1.49 m/s    Ao VTI 23.70 cm    LVOT peak VTI 22.50 cm    AV valve area 4.08 cm²    AV Velocity Ratio 0.91     AV index (prosthetic) 0.95     JENA by Velocity Ratio 3.89 cm²    MV mean gradient 2 mmHg    MV peak gradient 4 mmHg    MV stenosis pressure 1/2 time 56.30 ms    MV valve area p 1/2 method 3.91 cm2    MV valve area by continuity eq 2.67 cm2    MV VTI 36.2 cm    Triscuspid Valve Regurgitation Peak Gradient 8 mmHg    PV PEAK VELOCITY 1.43 m/s    PV peak gradient 8 mmHg    Sinus 3.35 cm    STJ 2.41 cm    Ascending aorta 2.78 cm    IVC diameter 1.18 cm    Mean e' 0.17 m/s    ZLVIDS -0.68     ZLVIDD -0.84     LA Volume Index 24.3 mL/m2    LA volume 46.57 cm3    LA WIDTH 4.0 cm    RA Width 4.0 cm    TV resting pulmonary artery pressure 11 mmHg    RV TB RVSP 4 mmHg    Est. RA pres 3 mmHg   Troponin I    Collection Time: 04/27/24  7:46 AM   Result Value Ref Range    Troponin I 0.018 0.000 - 0.026 ng/mL   Hemoglobin    Collection Time: 04/27/24  7:46 AM   Result Value Ref Range    Hemoglobin 10.3 (L) 14.0 - 18.0 g/dL   Hematocrit    Collection Time: 04/27/24  7:46 AM   Result Value Ref Range    Hematocrit 30.9 (L) 40.0 - 54.0 %   CBC auto differential    Collection Time: 04/28/24  4:36 AM   Result Value Ref Range    WBC 7.07 3.90 - 12.70 K/uL    RBC 3.35 (L) 4.60 - 6.20 M/uL    Hemoglobin 10.2 (L) 14.0 - 18.0 g/dL    Hematocrit 31.6 (L) 40.0 - 54.0 %    MCV 94 82 - 98 fL    MCH 30.4 27.0 - 31.0 pg    MCHC 32.3 32.0 - 36.0 g/dL    RDW 12.5 11.5 - 14.5 %    Platelets 196 150 - 450 K/uL    MPV 13.0 (H) 9.2 - 12.9 fL    Immature Granulocytes 0.3 0.0 - 0.5 %    Gran # (ANC) 4.5 1.8 - 7.7 K/uL    Immature Grans (Abs) 0.02  0.00 - 0.04 K/uL    Lymph # 2.0 1.0 - 4.8 K/uL    Mono # 0.5 0.3 - 1.0 K/uL    Eos # 0.0 0.0 - 0.5 K/uL    Baso # 0.02 0.00 - 0.20 K/uL    nRBC 0 0 /100 WBC    Gran % 62.8 38.0 - 73.0 %    Lymph % 28.6 18.0 - 48.0 %    Mono % 7.4 4.0 - 15.0 %    Eosinophil % 0.6 0.0 - 8.0 %    Basophil % 0.3 0.0 - 1.9 %    Differential Method Automated    CBC auto differential    Collection Time: 04/29/24  4:37 AM   Result Value Ref Range    WBC 6.09 3.90 - 12.70 K/uL    RBC 3.29 (L) 4.60 - 6.20 M/uL    Hemoglobin 10.1 (L) 14.0 - 18.0 g/dL    Hematocrit 30.6 (L) 40.0 - 54.0 %    MCV 93 82 - 98 fL    MCH 30.7 27.0 - 31.0 pg    MCHC 33.0 32.0 - 36.0 g/dL    RDW 12.5 11.5 - 14.5 %    Platelets 222 150 - 450 K/uL    MPV 12.9 9.2 - 12.9 fL    Immature Granulocytes 0.3 0.0 - 0.5 %    Gran # (ANC) 3.6 1.8 - 7.7 K/uL    Immature Grans (Abs) 0.02 0.00 - 0.04 K/uL    Lymph # 2.0 1.0 - 4.8 K/uL    Mono # 0.4 0.3 - 1.0 K/uL    Eos # 0.1 0.0 - 0.5 K/uL    Baso # 0.02 0.00 - 0.20 K/uL    nRBC 0 0 /100 WBC    Gran % 58.6 38.0 - 73.0 %    Lymph % 32.5 18.0 - 48.0 %    Mono % 7.2 4.0 - 15.0 %    Eosinophil % 1.1 0.0 - 8.0 %    Basophil % 0.3 0.0 - 1.9 %    Differential Method Automated        Microbiology Results (last 7 days)       ** No results found for the last 168 hours. **            Imaging Results              CTA Acute GI [place], Abdomen and Pelvis (Final result)  Result time 04/27/24 06:54:41      Final result by Lucio Hall MD (04/27/24 06:54:41)                   Impression:      No acute findings in the abdomen or pelvis.      Electronically signed by: Lucio Hall MD  Date:    04/27/2024  Time:    06:54               Narrative:    EXAMINATION:  CTA ACUTE GI BLEED, ABDOMEN AND PELVIS    CLINICAL HISTORY:  suspect upper GI bleed, epigastric abdominal pain;    TECHNIQUE:  Helical CT images of the abdomen and pelvis were obtained prior to and after the IV administration of 75 mL of Omnipaque 350 .  Oral contrast was not given.  Post-contrast images were obtained in the arterial and delayed phases per GI bleed protocol.  Axial, coronal, and sagittal reconstructions were created from the source data, including MIP reconstructions for the arterial phase images.    COMPARISON:  04/20/2024.    FINDINGS:  Heart: Normal in size. No pericardial effusion. No significant calcific coronary atherosclerosis.    Lungs: Visualized portions of the lungs are clear.    Liver: Normal in size and contour.  No focal hepatic lesion.    Gallbladder: No calcified gallstones.    Bile Ducts: No evidence of dilated ducts.    Pancreas: No mass or peripancreatic fat stranding.    Spleen: Unremarkable.    Stomach and duodenum: Unremarkable.    Adrenals: Unremarkable.    Kidneys/ Ureters: Normal in size and location. Normal enhancement. No hydronephrosis or nephrolithiasis. No ureteral dilatation.    Bladder: No evidence of wall thickening.    Reproductive organs: Unremarkable.    Bowel/Mesentery: Small bowel is normal in caliber with no evidence of obstruction. No evidence of inflammation or wall thickening.  Colon demonstrates no focal wall thickening.  No abnormal enhancement identified to suggest GI bleeding source.    Peritoneum: No intraperitoneal free air or fluid    Lymph nodes: No retroperitoneal lymphadenopathy.    Vasculature: No aneurysm. No significant calcific atherosclerosis.    Abdominal wall:  Unremarkable.    Bones: No acute fracture. No suspicious osseous lesions.                                       X-Ray Abdomen Flat And Erect (Final result)  Result time 04/27/24 03:36:27      Final result by Nicholas Anderson MD (04/27/24 03:36:27)                   Impression:      Nonobstructed bowel-gas pattern.      Electronically signed by: Nicholas Anderson MD  Date:    04/27/2024  Time:    03:36               Narrative:    EXAMINATION:  XR ABDOMEN FLAT AND ERECT    CLINICAL HISTORY:  Unspecified abdominal pain    TECHNIQUE:  Flat and erect AP views of the  abdomen were preformed.    COMPARISON:  None    FINDINGS:  There are no calcifications overlying the kidneys.  Bowel gas pattern is non-obstructive.  No evidence for pneumoperitoneum.  Regional osseous structures are unremarkable.                                       X-Ray Chest 1 View (Final result)  Result time 04/27/24 03:24:56      Final result by Nicholas Anderson MD (04/27/24 03:24:56)                   Impression:      No acute findings in the chest.      Electronically signed by: Nicholas Anderson MD  Date:    04/27/2024  Time:    03:24               Narrative:    EXAMINATION:  XR CHEST 1 VIEW    CLINICAL HISTORY:  Chest pain, unspecified    TECHNIQUE:  Single frontal view of the chest was performed.    COMPARISON:  11/13/2020.    FINDINGS:  No consolidation, pleural effusion or pneumothorax.    Cardiomediastinal silhouette is unremarkable.                                          Pending Diagnostic Studies:       Procedure Component Value Units Date/Time    Specimen to Pathology, Surgery Gastrointestinal tract [[id number]] Collected: 04/29/24 1050    Order Status: Sent Lab Status: In process Updated: 04/29/24 1051    Specimen: Tissue            Medications:  Reconciled Home Medications:      Medication List        CHANGE how you take these medications      pantoprazole 40 MG tablet  Commonly known as: PROTONIX  Take 1 tablet (40 mg total) by mouth once daily.  What changed: when to take this            STOP taking these medications      docusate sodium 100 MG capsule  Commonly known as: COLACE     ibuprofen 600 MG tablet  Commonly known as: ADVIL,MOTRIN     ondansetron 4 MG Tbdl  Commonly known as: ZOFRAN-ODT              Indwelling Lines/Drains at time of discharge:   Lines/Drains/Airways       None                   Time spent on the discharge of patient: 35 minutes         Ramsey Gregorio MD  Department of Hospital Medicine  [facility]

## 2024-04-29 NOTE — PLAN OF CARE
Procedure and recovery complete. Awake and alert. Family at bedside. Resp. Even and unlabored. SAT's 99% on room air. Report given to primary nurse. No acute distress noted.

## 2024-04-29 NOTE — ANESTHESIA PREPROCEDURE EVALUATION
04/29/2024  Emy Baig is a 33 y.o., male.      Pre-op Assessment    I have reviewed the Patient Summary Reports.     I have reviewed the Nursing Notes. I have reviewed the NPO Status.   I have reviewed the Medications.     Review of Systems  Anesthesia Hx:  No previous Anesthesia             Denies Family Hx of Anesthesia complications.    Denies Personal Hx of Anesthesia complications.                    Social:  Smoker, No Alcohol Use       Hematology/Oncology:    Oncology Normal    -- Anemia:                                  EENT/Dental:  EENT/Dental Normal           Cardiovascular:  Cardiovascular Normal                                            Renal/:  Renal/ Normal                 Hepatic/GI:  Hepatic/GI Normal                 Musculoskeletal:  Musculoskeletal Normal                Neurological:  Neurology Normal                                      Dermatological:  Skin Normal        Physical Exam  General: Oriented, Alert, Cooperative and Well nourished    Airway:  Mallampati: I   Mouth Opening: Normal  TM Distance: Normal  Tongue: Normal    Dental:  Intact        Anesthesia Plan  Type of Anesthesia, risks & benefits discussed:    Anesthesia Type: Gen Natural Airway  Intra-op Monitoring Plan: Standard ASA Monitors  Post Op Pain Control Plan: multimodal analgesia  Induction:  IV  Informed Consent: Informed consent signed with the Patient and all parties understand the risks and agree with anesthesia plan.  All questions answered.   ASA Score: 2    Ready For Surgery From Anesthesia Perspective.     .

## 2024-04-29 NOTE — PLAN OF CARE
04/29/24 1230   Final Note   Assessment Type Final Discharge Note   Anticipated Discharge Disposition Home   Hospital Resources/Appts/Education Provided Community resources provided   Post-Acute Status   Post-Acute Authorization Other   Other Status No Post-Acute Service Needs     Pts nurse Georgina notified that the pt can d/c from CM standpoint

## 2024-04-29 NOTE — PLAN OF CARE
Problem: Adult Inpatient Plan of Care  Goal: Absence of Hospital-Acquired Illness or Injury  Outcome: Progressing  Intervention: Identify and Manage Fall Risk  Flowsheets (Taken 4/29/2024 0525)  Safety Promotion/Fall Prevention:   assistive device/personal item within reach   lighting adjusted   medications reviewed  Intervention: Prevent Skin Injury  Flowsheets (Taken 4/29/2024 0525)  Body Position: position changed independently     Problem: Fall Injury Risk  Goal: Absence of Fall and Fall-Related Injury  Outcome: Progressing

## 2024-04-29 NOTE — H&P
Short Stay Endoscopy History and Physical    PCP - No, Primary Doctor     Procedure - EGD  ASA - per anesthesia  Mallampati - per anesthesia  Plan of anesthesia - General, MAC    HPI:  This is a 33 y.o. male here for evaluation of :       hematemesis    ROS:  Constitutional: No fevers, chills, No weight loss  CV: No chest pain  Pulm: No cough, No shortness of breath  Ophtho: No vision changes  GI: see HPI  Derm: No rash    Medical History:  has a past medical history of Cannabinoid hyperemesis syndrome.    Surgical History:  has no past surgical history on file.    Family History: family history is not on file.. Otherwise no colon cancer, inflammatory bowel disease, or GI malignancies.    Social History:  reports that he has been smoking cigarettes. He has never used smokeless tobacco. He reports that he does not currently use alcohol. He reports current drug use. Drug: Marijuana.    Review of patient's allergies indicates:  No Known Allergies    Medications:   Medications Prior to Admission   Medication Sig Dispense Refill Last Dose    docusate sodium (COLACE) 100 MG capsule Take 100 mg by mouth 2 (two) times daily.   4/26/2024    ibuprofen (ADVIL,MOTRIN) 600 MG tablet Take 1 tablet (600 mg total) by mouth every 6 (six) hours as needed for Pain. 20 tablet 0 Past Week    ondansetron (ZOFRAN-ODT) 4 MG TbDL Take 1 tablet (4 mg total) by mouth 2 (two) times daily. 6 tablet 0 4/26/2024    pantoprazole (PROTONIX) 40 MG tablet Take 1 tablet by mouth once daily.   Unknown       Physical Exam:    Vital Signs:   Vitals:    04/29/24 1004   BP: 111/76   Pulse: 65   Resp: 18   Temp: 98 °F (36.7 °C)       Gen: NAD, lying comfortably  HENT: NCAT, oropharynx clear  Eyes: anicteric sclerae, EOMI grossly  Neck: supple, no visible masses/goiter  Cardiac: RRR  Lungs: non-labored breathing  Abd: soft, NT/ND, normoactive BS  Ext: no LE edema, warm, well perfused  Skin: skin intact on exposed body parts, no visible rashes,  lesions  Neuro: A&Ox4, neuro exam grossly intact, moves all extremities  Psych: appropriate mood, affect      Labs:  Lab Results   Component Value Date    WBC 6.09 04/29/2024    HGB 10.1 (L) 04/29/2024    HCT 30.6 (L) 04/29/2024     04/29/2024    ALT 27 04/27/2024    AST 29 04/27/2024     (L) 04/27/2024    K 3.7 04/27/2024     04/27/2024    CREATININE 0.9 04/27/2024    BUN 15 04/27/2024    CO2 28 04/27/2024    INR 1.0 04/27/2024       Plan:  EGD     I have explained the risks and benefits of endoscopy procedures to the patient including but not limited to bleeding, perforation, infection, and death.  The patient was asked if they understand and allowed to ask any further questions to their satisfaction.      Vic Thomas MD

## 2024-04-29 NOTE — NURSING
Patient returned to unit via stretcher, self transferred into bed, NAD noted, wife at bedside, vitals obtained.

## 2024-04-29 NOTE — ANESTHESIA POSTPROCEDURE EVALUATION
Anesthesia Post Evaluation    Patient: Emy Baig    Procedure(s) Performed: Procedure(s) (LRB):  EGD (ESOPHAGOGASTRODUODENOSCOPY) (N/A)    Final Anesthesia Type: general      Patient location during evaluation: GI PACU  Patient participation: Yes- Able to Participate  Level of consciousness: awake and alert  Post-procedure vital signs: reviewed and stable  Airway patency: patent    PONV status at discharge: No PONV  Anesthetic complications: no      Cardiovascular status: blood pressure returned to baseline and hemodynamically stable  Respiratory status: unassisted, spontaneous ventilation and room air  Hydration status: euvolemic  Follow-up not needed.              Vitals Value Taken Time   BP 92/52 04/29/24 1055   Temp 36.5 °C (97.7 °F) 04/29/24 1055   Pulse 66 04/29/24 1055   Resp 20 04/29/24 1055   SpO2 100 % 04/29/24 1055         No case tracking events are documented in the log.      Pain/Sergey Score: Pain Rating Prior to Med Admin: 5 (headache) (4/29/2024  3:45 AM)  Pain Rating Post Med Admin: 2 (4/29/2024  4:45 AM)  Sergey Score: 9 (4/29/2024 10:55 AM)

## 2024-04-29 NOTE — PROVATION PATIENT INSTRUCTIONS
Discharge Summary/Instructions after an Endoscopic Procedure  Patient Name: Emy Baig  Patient MRN: [id number]  Patient YOB: 1990 Monday, April 29, 2024  Vic Thomas MD  Dear patient,  As a result of recent federal legislation (The Federal Cures Act), you may   receive lab or pathology results from your procedure in your [facility]   account before your physician is able to contact you. Your physician or   their representative will relay the results to you with their   recommendations at their soonest availability.  Thank you,  RESTRICTIONS:  During your procedure today, you received medications for sedation.  These   medications may affect your judgment, balance and coordination.  Therefore,   for 24 hours, you have the following restrictions:   - DO NOT drive a car, operate machinery, make legal/financial decisions,   sign important papers or drink alcohol.    ACTIVITY:  Today: no heavy lifting, straining or running due to procedural   sedation/anesthesia.  The following day: return to full activity including work.  DIET:  Eat and drink normally unless instructed otherwise.     TREATMENT FOR COMMON SIDE EFFECTS:  - Mild abdominal pain, nausea, belching, bloating or excessive gas:  rest,   eat lightly and use a heating pad.  - Sore Throat: treat with throat lozenges and/or gargle with warm salt   water.  - Because air was used during the procedure, expelling large amounts of air   from your rectum or belching is normal.  - If a bowel prep was taken, you may not have a bowel movement for 1-3 days.    This is normal.  SYMPTOMS TO WATCH FOR AND REPORT TO YOUR PHYSICIAN:  1. Abdominal pain or bloating, other than gas cramps.  2. Chest pain.  3. Back pain.  4. Signs of infection such as: chills or fever occurring within 24 hours   after the procedure.  5. Rectal bleeding, which would show as bright red, maroon, or black stools.   (A tablespoon of blood from the rectum is not serious, especially if    hemorrhoids are present.)  6. Vomiting.  7. Weakness or dizziness.  GO DIRECTLY TO THE NEAREST EMERGENCY ROOM IF YOU HAVE ANY OF THE FOLLOWING:      Difficulty breathing              Chills and/or fever over 101 F   Persistent vomiting and/or vomiting blood   Severe abdominal pain   Severe chest pain   Black, tarry stools   Bleeding- more than one tablespoon   Any other symptom or condition that you feel may need urgent attention  Your doctor recommends these additional instructions:  If any biopsies were taken, your doctors clinic will contact you in 1 to 2   weeks with any results.  - Return patient to hospital fish for ongoing care.   - Resume previous diet.   - Use Protonix (pantoprazole) 40 mg PO BID for 12 weeks.   - Await pathology results.   - Avoid NSAID use (ibuprofen, aleve, advil, motrin, naproxen, etc)  For questions, problems or results please call your physician - Vic Thomas MD at Work:  [phone].  [facility] can be reached at [phone]     IF A COMPLICATION OR EMERGENCY SITUATION ARISES AND YOU ARE UNABLE TO REACH   YOUR PHYSICIAN - GO DIRECTLY TO THE EMERGENCY ROOM.  Vic Thomas MD  4/29/2024 10:55:07 AM  This report has been verified and signed electronically.  Dear patient,  As a result of recent federal legislation (The Federal Cures Act), you may   receive lab or pathology results from your procedure in your [facility]   account before your physician is able to contact you. Your physician or   their representative will relay the results to you with their   recommendations at their soonest availability.  Thank you,  [employer]ATION

## 2024-04-29 NOTE — NURSING
[facility] [place]  Nurses Note -- 4 Eyes        Date: 04/29/2024        Skin assessed on: Q Shift        [x] No Pressure Injuries Present                 []Prevention Measures Documented     [] Yes LDA Previously documented      [] Yes New Pressure Injury Discovered              [] LDA Added        Attending RN: JEB Erickson     Second RN: JEB Magaña

## 2024-04-29 NOTE — NURSING
[facility], [facility] [place]  Nurses Note -- 4 Eyes      4/28/2024       Skin assessed on: Q Shift      [x] No Pressure Injuries Present    []Prevention Measures Documented    [] Yes LDA  for Pressure Injury Previously documented     [] Yes New Pressure Injury Discovered   [] LDA for New Pressure Injury Added      Attending RN:  Gómez Chapman RN     Second RN:  JEB Conti

## 2024-04-29 NOTE — NURSING
Discharge instructions handed to pt. Pt verbalizes understanding. Denies any questions. IV discontinued and catheter intact. Vital signs stable, NADN, and afebrile. Discharged home with family at bedside.

## 2024-04-29 NOTE — DISCHARGE INSTRUCTIONS
Take protonix twice daily for 12 weeks, then follow up with GI for potential re-scope  Go to PCP this week and recheck you      Thank you for trusting [facility] and me with your care.  We are honored that you entrusted us with your healthcare needs. Your satisfaction is very important to us and we hope you have been very pleased with your experience at [facility]. After your discharge you may receive a survey asking you to rate your hospital experience- Please help us by completing this survey. We hope that you have received the very best care possible during your hospitalization at [facility], as your satisfaction is our top priority.    Let me know if there is anything more I can do!!              Ramsey Gregorio MD        Medical Director        Section Head of         Board-Certified IM Attending      PATIENT GENERAL DISCHARGE INFORMATION   Things that YOU are responsible for to Manage Your Care At Home:  1. Getting your prescriptions filled.  2. Taking you medications as directed. (DO NOT MISS ANY DOSES!)  3. Going to your follow-up doctor appointments.                 *This is important because it allows the doctor to monitor your progress and make changes.      If no one calls you for your appointments, please call ([phone]) and reschedule this appointment.   After discharge, if you need assistance, you can call [facility] Nurse Care Line for 24/7 assistance at [phone]  If you are experience any signs or symptoms, Call your doctor or Call [phone] and come to your nearest Emergency Room.    You should receive a call from [facility] within 48-72 hours to help manage your care after discharge.   Please try to make sure that you answer your phone for this important phone call.

## 2024-04-29 NOTE — PLAN OF CARE
Problem: Adult Inpatient Plan of Care  Goal: Plan of Care Review  Outcome: Met  Goal: Patient-Specific Goal (Individualized)  Outcome: Met  Goal: Absence of Hospital-Acquired Illness or Injury  Outcome: Met  Goal: Optimal Comfort and Wellbeing  Outcome: Met  Goal: Readiness for Transition of Care  Outcome: Met     Problem: Gastrointestinal Bleeding  Goal: Optimal Coping with Acute Illness  Outcome: Met  Goal: Hemostasis  Outcome: Met     Problem: Fall Injury Risk  Goal: Absence of Fall and Fall-Related Injury  Outcome: Met

## 2024-04-30 LAB
OHS QRS DURATION: 84 MS
OHS QTC CALCULATION: 431 MS

## 2024-05-07 LAB
FINAL PATHOLOGIC DIAGNOSIS: NORMAL
GROSS: NORMAL
Lab: NORMAL
MICROSCOPIC EXAM: NORMAL
SUPPLEMENTAL DIAGNOSIS: NORMAL

## 2024-07-29 PROBLEM — K92.2 UPPER GI BLEED: Status: RESOLVED | Noted: 2024-04-27 | Resolved: 2024-07-29

## 2024-10-25 ENCOUNTER — PATIENT MESSAGE (OUTPATIENT)
Dept: RESEARCH | Facility: HOSPITAL | Age: 34
End: 2024-10-25